# Patient Record
Sex: FEMALE | Race: OTHER | HISPANIC OR LATINO | Employment: UNEMPLOYED | URBAN - METROPOLITAN AREA
[De-identification: names, ages, dates, MRNs, and addresses within clinical notes are randomized per-mention and may not be internally consistent; named-entity substitution may affect disease eponyms.]

---

## 2018-02-27 ENCOUNTER — HOSPITAL ENCOUNTER (EMERGENCY)
Facility: HOSPITAL | Age: 28
Discharge: HOME | End: 2018-02-27
Payer: MEDICAID

## 2018-02-27 VITALS
HEIGHT: 62 IN | SYSTOLIC BLOOD PRESSURE: 132 MMHG | BODY MASS INDEX: 29.81 KG/M2 | RESPIRATION RATE: 18 BRPM | DIASTOLIC BLOOD PRESSURE: 66 MMHG | OXYGEN SATURATION: 99 % | TEMPERATURE: 98 F | WEIGHT: 162 LBS | HEART RATE: 73 BPM

## 2018-02-27 DIAGNOSIS — Z3A.08 8 WEEKS GESTATION OF PREGNANCY: Primary | ICD-10-CM

## 2018-02-27 DIAGNOSIS — R11.2 NAUSEA AND VOMITING, INTRACTABILITY OF VOMITING NOT SPECIFIED, UNSPECIFIED VOMITING TYPE: ICD-10-CM

## 2018-02-27 DIAGNOSIS — O20.9 VAGINAL BLEEDING AFFECTING EARLY PREGNANCY: ICD-10-CM

## 2018-02-27 DIAGNOSIS — R10.9 ABDOMINAL PAIN, UNSPECIFIED ABDOMINAL LOCATION: ICD-10-CM

## 2018-02-27 LAB
ABO + RH BLD: NORMAL
ALBUMIN SERPL BCP-MCNC: 3.8 G/DL
ALP SERPL-CCNC: 77 U/L
ALT SERPL W/O P-5'-P-CCNC: 10 U/L
ANION GAP SERPL CALC-SCNC: 8 MMOL/L
ANISOCYTOSIS BLD QL SMEAR: SLIGHT
AST SERPL-CCNC: 13 U/L
B-HCG UR QL: POSITIVE
BACTERIA GENITAL QL WET PREP: ABNORMAL
BASOPHILS # BLD AUTO: 0.03 K/UL
BASOPHILS NFR BLD: 0.3 %
BILIRUB SERPL-MCNC: 0.2 MG/DL
BILIRUB UR QL STRIP: NEGATIVE
BUN SERPL-MCNC: 9 MG/DL
CALCIUM SERPL-MCNC: 10.1 MG/DL
CHLORIDE SERPL-SCNC: 102 MMOL/L
CLARITY UR: CLEAR
CLUE CELLS VAG QL WET PREP: ABNORMAL
CO2 SERPL-SCNC: 27 MMOL/L
COLOR UR: NORMAL
CREAT SERPL-MCNC: 0.7 MG/DL
CTP QC/QA: YES
DACRYOCYTES BLD QL SMEAR: ABNORMAL
DIFFERENTIAL METHOD: ABNORMAL
EOSINOPHIL # BLD AUTO: 0.1 K/UL
EOSINOPHIL NFR BLD: 0.8 %
ERYTHROCYTE [DISTWIDTH] IN BLOOD BY AUTOMATED COUNT: 14.5 %
EST. GFR  (AFRICAN AMERICAN): >60 ML/MIN/1.73 M^2
EST. GFR  (NON AFRICAN AMERICAN): >60 ML/MIN/1.73 M^2
FILAMENT FUNGI VAG WET PREP-#/AREA: ABNORMAL
GLUCOSE SERPL-MCNC: 116 MG/DL
GLUCOSE UR QL STRIP: NEGATIVE
HCG INTACT+B SERPL-ACNC: NORMAL MIU/ML
HCT VFR BLD AUTO: 36.2 %
HGB BLD-MCNC: 12.4 G/DL
HGB UR QL STRIP: NEGATIVE
KETONES UR QL STRIP: NEGATIVE
LEUKOCYTE ESTERASE UR QL STRIP: NEGATIVE
LYMPHOCYTES # BLD AUTO: 1.7 K/UL
LYMPHOCYTES NFR BLD: 16.6 %
MCH RBC QN AUTO: 25.1 PG
MCHC RBC AUTO-ENTMCNC: 34.3 G/DL
MCV RBC AUTO: 73 FL
MONOCYTES # BLD AUTO: 0.6 K/UL
MONOCYTES NFR BLD: 5.5 %
NEUTROPHILS # BLD AUTO: 7.9 K/UL
NEUTROPHILS NFR BLD: 76.8 %
NITRITE UR QL STRIP: NEGATIVE
PH UR STRIP: 6 [PH] (ref 5–8)
PLATELET # BLD AUTO: 277 K/UL
PLATELET BLD QL SMEAR: ABNORMAL
PMV BLD AUTO: 11.2 FL
POTASSIUM SERPL-SCNC: 3.6 MMOL/L
PROT SERPL-MCNC: 8.3 G/DL
PROT UR QL STRIP: NEGATIVE
RBC # BLD AUTO: 4.95 M/UL
SODIUM SERPL-SCNC: 137 MMOL/L
SP GR UR STRIP: 1.01 (ref 1–1.03)
SPECIMEN SOURCE: ABNORMAL
T VAGINALIS GENITAL QL WET PREP: ABNORMAL
URN SPEC COLLECT METH UR: NORMAL
UROBILINOGEN UR STRIP-ACNC: NEGATIVE EU/DL
WBC # BLD AUTO: 10.33 K/UL
WBC #/AREA VAG WET PREP: ABNORMAL
YEAST GENITAL QL WET PREP: ABNORMAL

## 2018-02-27 PROCEDURE — 84702 CHORIONIC GONADOTROPIN TEST: CPT

## 2018-02-27 PROCEDURE — 63600175 PHARM REV CODE 636 W HCPCS: Performed by: NURSE PRACTITIONER

## 2018-02-27 PROCEDURE — 99284 EMERGENCY DEPT VISIT MOD MDM: CPT | Mod: 25

## 2018-02-27 PROCEDURE — 25000003 PHARM REV CODE 250: Performed by: NURSE PRACTITIONER

## 2018-02-27 PROCEDURE — 86901 BLOOD TYPING SEROLOGIC RH(D): CPT

## 2018-02-27 PROCEDURE — 80053 COMPREHEN METABOLIC PANEL: CPT

## 2018-02-27 PROCEDURE — 96360 HYDRATION IV INFUSION INIT: CPT

## 2018-02-27 PROCEDURE — 81003 URINALYSIS AUTO W/O SCOPE: CPT

## 2018-02-27 PROCEDURE — 81025 URINE PREGNANCY TEST: CPT | Performed by: NURSE PRACTITIONER

## 2018-02-27 PROCEDURE — 87491 CHLMYD TRACH DNA AMP PROBE: CPT

## 2018-02-27 PROCEDURE — 85025 COMPLETE CBC W/AUTO DIFF WBC: CPT

## 2018-02-27 PROCEDURE — 87210 SMEAR WET MOUNT SALINE/INK: CPT

## 2018-02-27 RX ORDER — ONDANSETRON 4 MG/1
4 TABLET, ORALLY DISINTEGRATING ORAL
Status: COMPLETED | OUTPATIENT
Start: 2018-02-27 | End: 2018-02-27

## 2018-02-27 RX ORDER — ONDANSETRON 4 MG/1
4 TABLET, ORALLY DISINTEGRATING ORAL EVERY 8 HOURS PRN
Qty: 10 TABLET | Refills: 0 | Status: SHIPPED | OUTPATIENT
Start: 2018-02-27 | End: 2018-03-12 | Stop reason: ALTCHOICE

## 2018-02-27 RX ADMIN — ONDANSETRON 4 MG: 4 TABLET, ORALLY DISINTEGRATING ORAL at 06:02

## 2018-02-27 RX ADMIN — DEXTROSE AND SODIUM CHLORIDE 1000 ML: 5; .9 INJECTION, SOLUTION INTRAVENOUS at 04:02

## 2018-02-27 NOTE — ED PROVIDER NOTES
"Encounter Date: 2018    SCRIBE #1 NOTE: I, Tiago Gomez, am scribing for, and in the presence of,  Alize Aceves NP. I have scribed the following portions of the note - Other sections scribed: HPI and ROS.       History     Chief Complaint   Patient presents with    Vaginal Bleeding     Vaginal bleeding with abdominal cramping x 1 hour.  8 weeks pregnant.     CC: Vaginal Bleeding    HPI: This 27 y.o F (8 weeks gravid A0) with no pertinent PMHx presents to the ED c/o acute onset of mild (2/10) lower abdominal cramping and vaginal bleeding which began approximately 30 minutes PTA. The pt reports blood clots in the toilet. The pt has been seen by an OBGYN and an ultrasound was performed 18. The pt also reports nausea, emesis, chills and decreased appetite since the onset of her pregnancy. The pt reports a previous episode of vaginal bleeding 2 months into her previous pregnancy. The pt denies fever, SOB, dysuria, frequency and vaginal discharge. The pt attempted tx with an OTC nausea medication.       The history is provided by the patient. No  was used.     Review of patient's allergies indicates:  No Known Allergies  History reviewed. No pertinent past medical history.  History reviewed. No pertinent surgical history.  Family History   Problem Relation Age of Onset    Breast cancer Neg Hx     Colon cancer Neg Hx     Ovarian cancer Neg Hx      Social History   Substance Use Topics    Smoking status: Never Smoker    Smokeless tobacco: Never Used    Alcohol use No     Review of Systems   Constitutional: Positive for chills. Negative for diaphoresis and fever.   HENT: Negative for rhinorrhea and sore throat.    Eyes: Negative for redness.   Respiratory: Negative for cough and shortness of breath.    Cardiovascular: Negative for chest pain.   Gastrointestinal: Positive for abdominal pain (lower, "cramping"), nausea and vomiting. Negative for diarrhea.   Genitourinary: Positive " for vaginal bleeding (clots). Negative for dysuria, frequency, pelvic pain, urgency, vaginal discharge and vaginal pain.   Musculoskeletal: Negative for back pain and neck pain.   Skin: Negative for rash.   Psychiatric/Behavioral: The patient is not nervous/anxious.        Physical Exam     Initial Vitals [02/27/18 1454]   BP Pulse Resp Temp SpO2   130/87 89 18 98.4 °F (36.9 °C) 99 %      MAP       101.33         Physical Exam    Nursing note and vitals reviewed.  Constitutional: Vital signs are normal. She appears well-developed and well-nourished. She is cooperative.  Non-toxic appearance. She does not have a sickly appearance. She does not appear ill. No distress.   HENT:   Head: Normocephalic and atraumatic.   Right Ear: External ear normal.   Left Ear: External ear normal.   Mouth/Throat: Uvula is midline, oropharynx is clear and moist and mucous membranes are normal.   Eyes: Conjunctivae and EOM are normal. Pupils are equal, round, and reactive to light.   Neck: Normal range of motion and full passive range of motion without pain. Neck supple.   Cardiovascular: Normal rate, regular rhythm, normal heart sounds, intact distal pulses and normal pulses. Exam reveals no decreased pulses.    No murmur heard.  Pulmonary/Chest: Effort normal and breath sounds normal. No respiratory distress. She has no decreased breath sounds. She has no wheezes. She exhibits no tenderness.   Abdominal: Soft. Normal appearance and bowel sounds are normal. She exhibits no distension and no mass. There is no tenderness. There is no rigidity, no rebound, no guarding and no CVA tenderness.   Genitourinary: Rectum normal and vagina normal. Pelvic exam was performed with patient supine. Uterus is enlarged. Uterus is not tender. Cervix exhibits no motion tenderness, no discharge and no friability. Right adnexum displays no tenderness. Left adnexum displays no tenderness. No tenderness or bleeding in the vagina. No vaginal discharge found.    Musculoskeletal: Normal range of motion.   Neurological: She is alert and oriented to person, place, and time. She has normal strength and normal reflexes. GCS eye subscore is 4. GCS verbal subscore is 5. GCS motor subscore is 6.   Skin: Skin is warm, dry and intact. Capillary refill takes less than 2 seconds. No rash noted. No pallor.   Psychiatric: She has a normal mood and affect. Her behavior is normal. Judgment and thought content normal.         ED Course   Procedures  Labs Reviewed   CBC W/ AUTO DIFFERENTIAL - Abnormal; Notable for the following:        Result Value    Hematocrit 36.2 (*)     MCV 73 (*)     MCH 25.1 (*)     Gran # (ANC) 7.9 (*)     Gran% 76.8 (*)     Lymph% 16.6 (*)     All other components within normal limits   COMPREHENSIVE METABOLIC PANEL - Abnormal; Notable for the following:     Glucose 116 (*)     All other components within normal limits   VAGINAL SCREEN - Abnormal; Notable for the following:     WBC - Vaginal Screen Rare (*)     Bacteria - Vaginal Screen Rare (*)     All other components within normal limits   POCT URINE PREGNANCY - Abnormal; Notable for the following:     POC Preg Test, Ur Positive (*)     All other components within normal limits   C. TRACHOMATIS/N. GONORRHOEAE BY AMP DNA   HCG, QUANTITATIVE, PREGNANCY   URINALYSIS   GROUP & RH          X-Rays:   Independently Interpreted Readings:   Other Readings:  Ultrasound shows a single intrauterine pregnancy corresponding to 8 weeks 5 days.  Fetal heart is 174.  Small amount of subchorionic hemorrhage.          APC / Resident Notes:   Hayley Coleman is a 27 y.o. Tongan-speaking gravid female who presents to the Emergency Department with abdominal cramping which began approximately one hour prior to arrival.  She subsequently was urinating and noticed a blood clot in the toilet.  Patient reports she is 8 weeks pregnant with her second child.  Patient sees Dr. Koch OB/GYN and has had adequate prenatal care in addition to an  ultrasound was performed on February 13 confirmed a single IUP.  This is the patient's second child.  Patient reports that with her first child she did start bleeding at 8 weeks however she was successfully able to carry the child to term.  She reports having nausea throughout her pregnancy and has diminished appetite with inability to hold liquids down on most days.  She reports that her abdominal pain had subsided by the time I examined her.    Physical Exam shows a non-toxic, afebrile, and well appearing female. Pertinent exam findings include HEENT normal, breath sounds are clear and equal in all fields, normal effort, abdomen is soft and nontender to palpation, uterus is not palpable.  Bowel sounds are present in all quadrants.  No CVA tenderness.  Pelvic exam shows a closed cervical os with no blood in the vaginal vault or in the os.  No rashes or lesions noted no trauma to the vagina.  Minimal amount of white stringy discharge.  No CMT, friability or petechiae.  Remaining exam is as above.    Vital Signs Are Reassuring. If available, previous records reviewed.   RESULTS: Urine pregnancy positive.  CMP shows no significant joint abnormality or acute kidney injury.  GC chlamydia pending.  Urinalysis shows no infection.  Vaginal screen negative for infection.  Beta Quant [id number].  CBC is without leukocytosis or significant anemia.    Ultrasound shows a single intrauterine pregnancy corresponding to 8 weeks 5 days.  Fetal heart is 174.  Small amount of subchorionic hemorrhage.  Arterial venous flow preserved to bilateral ovaries, suspected corpus luteum cyst in left ovary.  No free fluid.    My overall impression is threatened miscarriage . Differential diagnosis includes but is not limited to threatened miscarriage, subchorionic hemorrhage, vaginal trauma, ectopic pregnancy.      ED Course: zofran.  Patient was tolerating fluids prior to discharge.  D/C Meds: None. Additional D/C Information: Follow-up with  OB/GYN.  Pelvic rest.  Light duty at work.  No strenuous exercise until cleared by OB/GYN.  ED return precautions given.   and patient verbalize understanding of importance of follow-up with OB/GYN.  The diagnosis, treatment plan, instructions for follow-up and reevaluation with PCP as well as ED return precautions were discussed and understanding was verbalized. All questions or concerns have been addressed. Patient was discharged home with an instructional sheet which gave not only information regarding the most likely diagnoses but also information regarding when to return to the emergency department for alarming symptoms and when to seek further care.      This case was discussed with  Dr. Chaves who is in agreement with my assessment and plan.          Scribe Attestation:   Scribe #1: I performed the above scribed service and the documentation accurately describes the services I performed. I attest to the accuracy of the note.    Attending Attestation:     Physician Attestation Statement for NP/PA:   I discussed this assessment and plan of this patient with the NP/PA, but I did not personally examine the patient. The face to face encounter was performed by the NP/PA.        Physician Attestation for Scribe:  Physician Attestation Statement for Scribe #1: I, Alize Aceves NP, reviewed documentation, as scribed by Tiago Gomez in my presence, and it is both accurate and complete.                    Clinical Impression:   The primary encounter diagnosis was 8 weeks gestation of pregnancy. Diagnoses of Vaginal bleeding affecting early pregnancy, Abdominal pain, unspecified abdominal location, and Nausea and vomiting, intractability of vomiting not specified, unspecified vomiting type were also pertinent to this visit.    Disposition:   Disposition: Discharged  Condition: Stable                        Alize Aceves NP  02/27/18 2245       Jose Chaves MD  03/02/18 1002

## 2018-02-27 NOTE — ED TRIAGE NOTES
Pt. Reports she used the bathroom and noted a blood clot this AM. Pt. States she is 8 weeks pregnant. Pt. Is c/o abd cramping at the present moment.

## 2018-02-28 LAB
C TRACH DNA SPEC QL NAA+PROBE: NOT DETECTED
N GONORRHOEA DNA SPEC QL NAA+PROBE: NOT DETECTED

## 2018-02-28 NOTE — DISCHARGE INSTRUCTIONS
Deberá permanecer en reposo pélvico hasta que johnson obstetra / ginecólogo lo autorice. Por favor, haz un trabajo ligero si tienes que trabajar. Sin actividad extenuante Puede jennifer Zofran según sea necesario para las náuseas o los vómitos. Por favor mantente mane hidratado. Por favor regrese al departamento de emergencias por cualquier aumento en sangrado o cualquier preocupación.    Por favor, yong un seguimiento con johnson OB / GYN dentro de los próximos días.    Regrese al Departamento de Emergencia para cualquier síntoma nuevo o que empeore, martha fiebre, dolor en el pecho, dificultad para respirar, pérdida del conocimiento, mareos, debilidad o cualquier otra inquietud.    Por favor yong un seguimiento con johnson [facility] dentro de la semana. Si no tiene claudia, puede comunicarse con el que figura en johnson documentación de marilou o también puede llamar al [facility]as de la [facility] al [phone] para programar ian val con claudia.    Por favor, tome todos los medicamentos según lo prescrito.

## 2018-03-12 ENCOUNTER — HOSPITAL ENCOUNTER (EMERGENCY)
Facility: HOSPITAL | Age: 28
Discharge: HOME | End: 2018-03-12
Attending: EMERGENCY MEDICINE
Payer: MEDICAID

## 2018-03-12 VITALS
TEMPERATURE: 99 F | HEART RATE: 82 BPM | BODY MASS INDEX: 29.63 KG/M2 | DIASTOLIC BLOOD PRESSURE: 83 MMHG | SYSTOLIC BLOOD PRESSURE: 119 MMHG | OXYGEN SATURATION: 97 % | WEIGHT: 162 LBS | RESPIRATION RATE: 18 BRPM

## 2018-03-12 DIAGNOSIS — R51.9 NONINTRACTABLE EPISODIC HEADACHE, UNSPECIFIED HEADACHE TYPE: Primary | ICD-10-CM

## 2018-03-12 DIAGNOSIS — Z3A.13 13 WEEKS GESTATION OF PREGNANCY: ICD-10-CM

## 2018-03-12 DIAGNOSIS — R11.10 NON-INTRACTABLE VOMITING, PRESENCE OF NAUSEA NOT SPECIFIED, UNSPECIFIED VOMITING TYPE: ICD-10-CM

## 2018-03-12 LAB
ALBUMIN SERPL BCP-MCNC: 3.7 G/DL
ALP SERPL-CCNC: 74 U/L
ALT SERPL W/O P-5'-P-CCNC: 13 U/L
ANION GAP SERPL CALC-SCNC: 10 MMOL/L
AST SERPL-CCNC: 14 U/L
B-HCG UR QL: POSITIVE
BASOPHILS # BLD AUTO: 0.02 K/UL
BASOPHILS NFR BLD: 0.1 %
BILIRUB SERPL-MCNC: 0.4 MG/DL
BILIRUB UR QL STRIP: NEGATIVE
BUN SERPL-MCNC: 7 MG/DL
CALCIUM SERPL-MCNC: 10 MG/DL
CHLORIDE SERPL-SCNC: 102 MMOL/L
CLARITY UR: CLEAR
CO2 SERPL-SCNC: 24 MMOL/L
COLOR UR: YELLOW
CREAT SERPL-MCNC: 0.7 MG/DL
CTP QC/QA: YES
DIFFERENTIAL METHOD: ABNORMAL
EOSINOPHIL # BLD AUTO: 0.1 K/UL
EOSINOPHIL NFR BLD: 0.6 %
ERYTHROCYTE [DISTWIDTH] IN BLOOD BY AUTOMATED COUNT: 15.4 %
EST. GFR  (AFRICAN AMERICAN): >60 ML/MIN/1.73 M^2
EST. GFR  (NON AFRICAN AMERICAN): >60 ML/MIN/1.73 M^2
GLUCOSE SERPL-MCNC: 86 MG/DL
GLUCOSE UR QL STRIP: NEGATIVE
HCT VFR BLD AUTO: 38.4 %
HGB BLD-MCNC: 12.9 G/DL
HGB UR QL STRIP: NEGATIVE
KETONES UR QL STRIP: NEGATIVE
LEUKOCYTE ESTERASE UR QL STRIP: NEGATIVE
LIPASE SERPL-CCNC: 15 U/L
LYMPHOCYTES # BLD AUTO: 1.8 K/UL
LYMPHOCYTES NFR BLD: 13.5 %
MCH RBC QN AUTO: 25.3 PG
MCHC RBC AUTO-ENTMCNC: 33.6 G/DL
MCV RBC AUTO: 75 FL
MONOCYTES # BLD AUTO: 0.7 K/UL
MONOCYTES NFR BLD: 5.4 %
NEUTROPHILS # BLD AUTO: 11 K/UL
NEUTROPHILS NFR BLD: 80.1 %
NITRITE UR QL STRIP: NEGATIVE
PH UR STRIP: 7 [PH] (ref 5–8)
PLATELET # BLD AUTO: 238 K/UL
PMV BLD AUTO: 10.8 FL
POTASSIUM SERPL-SCNC: 3.7 MMOL/L
PROT SERPL-MCNC: 8.4 G/DL
PROT UR QL STRIP: NEGATIVE
RBC # BLD AUTO: 5.09 M/UL
SODIUM SERPL-SCNC: 136 MMOL/L
SP GR UR STRIP: 1.01 (ref 1–1.03)
URN SPEC COLLECT METH UR: NORMAL
UROBILINOGEN UR STRIP-ACNC: NEGATIVE EU/DL
WBC # BLD AUTO: 13.68 K/UL

## 2018-03-12 PROCEDURE — 80053 COMPREHEN METABOLIC PANEL: CPT

## 2018-03-12 PROCEDURE — 25000003 PHARM REV CODE 250: Performed by: NURSE PRACTITIONER

## 2018-03-12 PROCEDURE — 81025 URINE PREGNANCY TEST: CPT | Performed by: NURSE PRACTITIONER

## 2018-03-12 PROCEDURE — 81003 URINALYSIS AUTO W/O SCOPE: CPT

## 2018-03-12 PROCEDURE — 96361 HYDRATE IV INFUSION ADD-ON: CPT

## 2018-03-12 PROCEDURE — 99284 EMERGENCY DEPT VISIT MOD MDM: CPT | Mod: 25

## 2018-03-12 PROCEDURE — 85025 COMPLETE CBC W/AUTO DIFF WBC: CPT

## 2018-03-12 PROCEDURE — 63600175 PHARM REV CODE 636 W HCPCS: Performed by: NURSE PRACTITIONER

## 2018-03-12 PROCEDURE — 96374 THER/PROPH/DIAG INJ IV PUSH: CPT

## 2018-03-12 PROCEDURE — 83690 ASSAY OF LIPASE: CPT

## 2018-03-12 RX ORDER — ACETAMINOPHEN 500 MG
500 TABLET ORAL
Status: COMPLETED | OUTPATIENT
Start: 2018-03-12 | End: 2018-03-12

## 2018-03-12 RX ORDER — METOCLOPRAMIDE HYDROCHLORIDE 5 MG/ML
10 INJECTION INTRAMUSCULAR; INTRAVENOUS
Status: COMPLETED | OUTPATIENT
Start: 2018-03-12 | End: 2018-03-12

## 2018-03-12 RX ORDER — ONDANSETRON 8 MG/1
8 TABLET, ORALLY DISINTEGRATING ORAL
Status: COMPLETED | OUTPATIENT
Start: 2018-03-12 | End: 2018-03-12

## 2018-03-12 RX ORDER — ONDANSETRON 4 MG/1
4 TABLET, FILM COATED ORAL EVERY 8 HOURS PRN
Qty: 12 TABLET | Refills: 0 | Status: ON HOLD | OUTPATIENT
Start: 2018-03-12 | End: 2018-07-04 | Stop reason: HOSPADM

## 2018-03-12 RX ADMIN — ONDANSETRON 8 MG: 8 TABLET, ORALLY DISINTEGRATING ORAL at 12:03

## 2018-03-12 RX ADMIN — SODIUM CHLORIDE 1000 ML: 0.9 INJECTION, SOLUTION INTRAVENOUS at 10:03

## 2018-03-12 RX ADMIN — METOCLOPRAMIDE 10 MG: 5 INJECTION, SOLUTION INTRAMUSCULAR; INTRAVENOUS at 10:03

## 2018-03-12 RX ADMIN — ACETAMINOPHEN 500 MG: 500 TABLET ORAL at 10:03

## 2018-03-12 NOTE — ED PROVIDER NOTES
Encounter Date: 3/12/2018    SCRIBE #1 NOTE: I, Isamar Whatley, am scribing for, and in the presence of,  Sushil Becerra NP. I have scribed the following portions of the note - Other sections scribed: HPI and ROS.       History     Chief Complaint   Patient presents with    Headache     x 3 days. Last tylenol at 630 am    Vomiting     x 3days      Chief Complaint: Headache    HPI: This 11 weeks gravid  27 y.o. Female with no known  PMHx presents to the ED c/o a gradual onset intermittent posterior headache and neck pain.  She reports her symptoms began 3 days ago.  She describes these headaches as intermittent and severe.  She does report her symptoms are temporarily relieved with Tylenol but then return. Symptoms began 3 days ago. Headache is severe. Symptoms are mildly and temporarily resolved with Tylenol. There's associated nausea, vomiting x 4 in last 24 hours. No back pain at this time.       The history is provided by the patient. A  was used.     Review of patient's allergies indicates:  No Known Allergies  History reviewed. No pertinent past medical history.  History reviewed. No pertinent surgical history.  Family History   Problem Relation Age of Onset    Breast cancer Neg Hx     Colon cancer Neg Hx     Ovarian cancer Neg Hx      Social History   Substance Use Topics    Smoking status: Never Smoker    Smokeless tobacco: Never Used    Alcohol use Not on file     Review of Systems   Constitutional: Negative for chills and fever.   HENT: Negative for ear pain and sore throat.    Eyes: Negative for pain.   Respiratory: Negative for cough and shortness of breath.    Cardiovascular: Negative for chest pain.   Gastrointestinal: Positive for nausea and vomiting. Negative for abdominal pain and diarrhea.   Genitourinary: Negative for dysuria.   Musculoskeletal: Positive for neck pain. Negative for neck stiffness. Myalgias: arm or leg pain.   Skin: Negative for rash and wound.    Neurological: Positive for headaches. Negative for seizures and speech difficulty.   Psychiatric/Behavioral: Negative for confusion.       Physical Exam     Initial Vitals [03/12/18 0934]   BP Pulse Resp Temp SpO2   115/79 88 18 98.2 °F (36.8 °C) 100 %      MAP       91         Physical Exam    Nursing note and vitals reviewed.  Constitutional: She appears well-developed and well-nourished. She is not diaphoretic. She is cooperative.  Non-toxic appearance. She does not have a sickly appearance. No distress.   HENT:   Head: Normocephalic and atraumatic.   Right Ear: Tympanic membrane and external ear normal.   Left Ear: Tympanic membrane and external ear normal.   Nose: Nose normal.   Mouth/Throat: Uvula is midline, oropharynx is clear and moist and mucous membranes are normal. No trismus in the jaw.   Eyes: Conjunctivae and EOM are normal.   Neck: Normal range of motion and phonation normal. Neck supple. Muscular tenderness present. No spinous process tenderness present. No tracheal deviation and normal range of motion present. No neck rigidity.   Cardiovascular: Normal rate, regular rhythm and intact distal pulses.   Pulses:       Radial pulses are 2+ on the right side, and 2+ on the left side.   Pulmonary/Chest: Effort normal and breath sounds normal. No stridor. No tachypnea and no bradypnea. No respiratory distress. She has no wheezes. She has no rhonchi. She has no rales. She exhibits no tenderness.   Abdominal: Soft. There is no tenderness. There is no guarding.   Gravid   Musculoskeletal: Normal range of motion.   Neurological: She is alert and oriented to person, place, and time. She has normal strength. No sensory deficit. Coordination and gait normal. GCS eye subscore is 4. GCS verbal subscore is 5. GCS motor subscore is 6.   Skin: Skin is warm, dry and intact. No bruising and no rash noted. No cyanosis or erythema. Nails show no clubbing.   Psychiatric: She has a normal mood and affect. Her behavior is  normal. Judgment and thought content normal.         ED Course   Procedures  Labs Reviewed   CBC W/ AUTO DIFFERENTIAL - Abnormal; Notable for the following:        Result Value    WBC 13.68 (*)     MCV 75 (*)     MCH 25.3 (*)     RDW 15.4 (*)     Gran # (ANC) 11.0 (*)     Gran% 80.1 (*)     Lymph% 13.5 (*)     All other components within normal limits   POCT URINE PREGNANCY - Abnormal; Notable for the following:     POC Preg Test, Ur Positive (*)     All other components within normal limits   COMPREHENSIVE METABOLIC PANEL   LIPASE   URINALYSIS                   APC / Resident Notes:   This is an evaluation of a 27-year-old female that presents emergency Department with complaints of a gradual occipital headache and neck pain and vomiting.  She reports the headache is been ongoing for the last several days it is relieved with Tylenol however it returns. Physical Exam shows a non-toxic, afebrile, and well appearing female. Ears and throat without evidence infection.  Breath sounds are clear and equal auscultation.  Heart regular rhythm.  Her abdomen is gravid without tenderness.  She moves all extremities.  There is no lower extremity swelling or edema.  Cranial nerves II through XII grossly intact.  No ataxia.  Pupils are equal, round, reactive to light.  Neck is soft with no cervical adenopathy or meningeal signs.  She has no neck stiffness. Vital Signs Are Reassuring. If available, previous records reviewed. RESULTS: UPT Positive.  CBC with mildly elevated white count at 13.68.  Normal H&H.  Normal platelet count.  CMP normal.  Lipase normal. UA without infection or proteinuria.  Reassessment: After medications, patient reports her headache is completely resolved.  She is tolerating oral fluids.  She did report some nausea after drinking been no vomiting.  I will give her dose of Zofran prior to discharge.  Fetal heart tones present.  Given the tenderness of the neck in the headache, do believe this is likely a  tension headache.    My overall impression is headache and vomiting in pregnancy. I considered, but at this time, do not suspect eclampsia, preeclampsia, UTI, pyelonephritis, pancreatitis, electrolyte abnormality, meningitis, proteinuria.    ED Course: IV fluids, Reglan and Zofran. D/C Meds: Zofran. D/C Information: [place] diet and progress as tolerated, return if headaches change or worsen. The diagnosis, treatment plan, instructions for follow-up and reevaluation with OB/GYN this week as well as ED return precautions were discussed and understanding was verbalized. All questions or concerns have been addressed. This case was discussed with Dr. Hinson who is in agreement with my assessment and plan. LAINEY Louise, FNP-C        Scribe Attestation:   Scribe #1: I performed the above scribed service and the documentation accurately describes the services I performed. I attest to the accuracy of the note.    Attending Attestation:           Physician Attestation for Scribe:  Physician Attestation Statement for Scribe #1: I, Sushil Becerra, AZUL, reviewed documentation, as scribed by Isamar Whatley in my presence, and it is both accurate and complete.                    Clinical Impression:   The primary encounter diagnosis was Nonintractable episodic headache, unspecified headache type. Diagnoses of Non-intractable vomiting, presence of nausea not specified, unspecified vomiting type and 13 weeks gestation of pregnancy were also pertinent to this visit.    Disposition:   Disposition: Discharged  Condition: Stable                        PAUL Colón  03/12/18 5914

## 2018-03-12 NOTE — ED TRIAGE NOTES
Severe headache x 3 days with  Neck pain  And general weakness.Tylenol taken last night. Patient is 3 months pregnant. Denies vision changes.

## 2018-03-12 NOTE — DISCHARGE INSTRUCTIONS
Por favor regrese al Servicio de Urgencia por cualquier síntoma nuevo o que empeora, incluyendo: empeoramiento o cambios en el dolor de wade, dolor de wade no michelle por tylenol, fiebre, dolor en el pecho, dificultad para respirar, pérdida del conocimiento, mareos, debilidad o cualquier otra inquietud.    Por favor yong un seguimiento con johnson OB / GYN dentro de la semana. Si no tiene claudia, puede comunicarse con el que figura en johnson documentación de marilou o también puede llamar al [facility] al [phone] para programar ian val con claudia.    -------------------------    Please return to the Emergency Department for any new or worsening symptoms including: worsening or changes in the headache, headache not improved by tylenol, fever, chest pain, shortness of breath, loss of consciousness, dizziness, weakness, or any other concerns.     Please follow up with your OB/GYN within in the week. If you do not have one, you may contact the one listed on your discharge paperwork or you may also call the [facility] Appointment Desk at [phone] to schedule an appointment with one.

## 2018-03-16 ENCOUNTER — HOSPITAL ENCOUNTER (EMERGENCY)
Facility: HOSPITAL | Age: 28
Discharge: HOME | End: 2018-03-16
Attending: EMERGENCY MEDICINE
Payer: MEDICAID

## 2018-03-16 VITALS
RESPIRATION RATE: 18 BRPM | SYSTOLIC BLOOD PRESSURE: 113 MMHG | TEMPERATURE: 98 F | HEART RATE: 73 BPM | OXYGEN SATURATION: 99 % | DIASTOLIC BLOOD PRESSURE: 69 MMHG | WEIGHT: 165 LBS | BODY MASS INDEX: 30.18 KG/M2

## 2018-03-16 DIAGNOSIS — O20.9 VAGINAL BLEEDING IN PREGNANCY, FIRST TRIMESTER: Primary | ICD-10-CM

## 2018-03-16 LAB
B-HCG UR QL: POSITIVE
BACTERIA #/AREA URNS HPF: ABNORMAL /HPF
BASOPHILS # BLD AUTO: 0.02 K/UL
BASOPHILS NFR BLD: 0.2 %
BILIRUB UR QL STRIP: NEGATIVE
CLARITY UR: ABNORMAL
COLOR UR: ABNORMAL
CTP QC/QA: YES
DIFFERENTIAL METHOD: ABNORMAL
EOSINOPHIL # BLD AUTO: 0.2 K/UL
EOSINOPHIL NFR BLD: 1.5 %
ERYTHROCYTE [DISTWIDTH] IN BLOOD BY AUTOMATED COUNT: 15.5 %
GLUCOSE UR QL STRIP: NEGATIVE
HCG INTACT+B SERPL-ACNC: NORMAL MIU/ML
HCT VFR BLD AUTO: 35.5 %
HGB BLD-MCNC: 11.9 G/DL
HGB UR QL STRIP: ABNORMAL
HYALINE CASTS #/AREA URNS LPF: 0 /LPF
KETONES UR QL STRIP: NEGATIVE
LEUKOCYTE ESTERASE UR QL STRIP: ABNORMAL
LYMPHOCYTES # BLD AUTO: 2.1 K/UL
LYMPHOCYTES NFR BLD: 20.2 %
MCH RBC QN AUTO: 25.3 PG
MCHC RBC AUTO-ENTMCNC: 33.5 G/DL
MCV RBC AUTO: 76 FL
MICROSCOPIC COMMENT: ABNORMAL
MONOCYTES # BLD AUTO: 0.8 K/UL
MONOCYTES NFR BLD: 7.9 %
NEUTROPHILS # BLD AUTO: 7.4 K/UL
NEUTROPHILS NFR BLD: 69.8 %
NITRITE UR QL STRIP: NEGATIVE
PH UR STRIP: 7 [PH] (ref 5–8)
PLATELET # BLD AUTO: 228 K/UL
PMV BLD AUTO: 11.2 FL
PROT UR QL STRIP: ABNORMAL
RBC # BLD AUTO: 4.7 M/UL
RBC #/AREA URNS HPF: >100 /HPF (ref 0–4)
SP GR UR STRIP: 1.02 (ref 1–1.03)
SQUAMOUS #/AREA URNS HPF: 2 /HPF
URN SPEC COLLECT METH UR: ABNORMAL
UROBILINOGEN UR STRIP-ACNC: NEGATIVE EU/DL
WBC # BLD AUTO: 10.57 K/UL
WBC #/AREA URNS HPF: 2 /HPF (ref 0–5)

## 2018-03-16 PROCEDURE — 81000 URINALYSIS NONAUTO W/SCOPE: CPT

## 2018-03-16 PROCEDURE — 85025 COMPLETE CBC W/AUTO DIFF WBC: CPT

## 2018-03-16 PROCEDURE — 84702 CHORIONIC GONADOTROPIN TEST: CPT

## 2018-03-16 PROCEDURE — 99284 EMERGENCY DEPT VISIT MOD MDM: CPT | Mod: 25

## 2018-03-16 PROCEDURE — 81025 URINE PREGNANCY TEST: CPT | Performed by: NURSE PRACTITIONER

## 2018-03-17 NOTE — ED NOTES
Patient presents to the ED stating that she started having some vaginal bleeding today. Patient denies any clotting, cramping or pain. Patient denies any pain with urination.

## 2018-03-17 NOTE — DISCHARGE INSTRUCTIONS
Regrese al servicio de urgencias por cualquier síntoma nuevo o que empeore: dolor intenso, sangrado abundante, pérdida del conocimiento o cualquier otra inquietud. Por favor yong un seguimiento con johnson OB / GYN dentro de la semana. También puede llamar al [phone] para la línea de citas del mismo día de la [facility].

## 2018-03-17 NOTE — ED PROVIDER NOTES
Encounter Date: 3/16/2018       History     Chief Complaint   Patient presents with    Vaginal Bleeding     approximately 9-10 weeks. 2 weeks ago had vaginal bleeding. It started again today     Chief Complaint: Vaginal bleeding    HPI: 27 year old, G2,T1,P0,A0,L1 Yemeni speaking female, is 13 weeks pregnant and accompanied by . Presents to ED with vaginal bleeding, which occurred while walking around a store about 2 hours ago. She describes bleeding as moderate with no clots noted. She denies abdominal pain, cramping, fever, chills, dysuria, frequency, or vaginal discharge prior to bleeding.  She has not taken any medications. She was recently in ED four days ago with complaint of headache.  She states her last vaginal exam was one month ago.  Symptoms are acute, moderate, and constant in nature.       The history is provided by the patient. The history is limited by a language barrier. A  was used.     Review of patient's allergies indicates:  No Known Allergies  History reviewed. No pertinent past medical history.  History reviewed. No pertinent surgical history.  Family History   Problem Relation Age of Onset    Breast cancer Neg Hx     Colon cancer Neg Hx     Ovarian cancer Neg Hx      Social History   Substance Use Topics    Smoking status: Never Smoker    Smokeless tobacco: Never Used    Alcohol use No     Review of Systems   Constitutional: Negative for activity change, chills and fever.   HENT: Negative for congestion.    Eyes: Negative for discharge and redness.   Respiratory: Negative for chest tightness and shortness of breath.    Cardiovascular: Negative for chest pain and leg swelling.   Gastrointestinal: Positive for constipation and nausea. Negative for vomiting.   Genitourinary: Positive for vaginal bleeding. Negative for difficulty urinating, dyspareunia, dysuria, flank pain, pelvic pain, urgency, vaginal discharge and vaginal pain.   Musculoskeletal: Negative for  back pain.   Skin: Negative for rash.       Physical Exam     Initial Vitals [03/16/18 1937]   BP Pulse Resp Temp SpO2   127/75 103 18 98.9 °F (37.2 °C) 99 %      MAP       92.33         Physical Exam    Constitutional: Vital signs are normal. She appears well-developed.  Non-toxic appearance. She does not appear ill. No distress.   HENT:   Head: Normocephalic.   Right Ear: Hearing normal.   Left Ear: Hearing normal.   Nose: No rhinorrhea.   Mouth/Throat: Oropharynx is clear and moist and mucous membranes are normal.   Eyes: Conjunctivae and lids are normal. Pupils are equal, round, and reactive to light.   Neck: Trachea normal and normal range of motion. Neck supple.   Cardiovascular: Normal rate, S1 normal, S2 normal, normal heart sounds, intact distal pulses and normal pulses.   No murmur heard.  Pulmonary/Chest: Effort normal and breath sounds normal. No tachypnea. No respiratory distress. She has no wheezes. She has no rhonchi. She has no rales.   Abdominal: Soft. Normal appearance. There is no tenderness. There is no rebound, no guarding and no CVA tenderness.   Genitourinary: Pelvic exam was performed with patient supine. There is no rash, tenderness or lesion on the right labia. There is no rash, tenderness or lesion on the left labia. Cervix exhibits no discharge. Right adnexum displays no tenderness. Left adnexum displays no tenderness. There is bleeding (small amount int he vault) in the vagina. No tenderness in the vagina. No vaginal discharge found.       Genitourinary Comments: Chaparoned by PAUL Maddox student    Lymphadenopathy:     She has no cervical adenopathy.   Neurological: She is alert and oriented to person, place, and time. She has normal strength.   Skin: Skin is warm and intact.   Psychiatric: She has a normal mood and affect. Her speech is normal and behavior is normal. Thought content normal.         ED Course   Procedures  Labs Reviewed   URINALYSIS - Abnormal; Notable for  the following:        Result Value    Color, UA Red (*)     Appearance, UA Cloudy (*)     Protein, UA 2+ (*)     Occult Blood UA 3+ (*)     Leukocytes, UA Trace (*)     All other components within normal limits   CBC W/ AUTO DIFFERENTIAL - Abnormal; Notable for the following:     Hemoglobin 11.9 (*)     Hematocrit 35.5 (*)     MCV 76 (*)     MCH 25.3 (*)     RDW 15.5 (*)     All other components within normal limits   URINALYSIS MICROSCOPIC - Abnormal; Notable for the following:     RBC, UA >100 (*)     All other components within normal limits   POCT URINE PREGNANCY - Abnormal; Notable for the following:     POC Preg Test, Ur Positive (*)     All other components within normal limits   HCG, QUANTITATIVE, PREGNANCY             Medical Decision Making:   ED Management:  27 year old female, 13 weeks pregnant, G2,T1,P0,A0,L1, presents to ED with vaginal bleeding that started approx 2 hours ago while walking around a store. She has no pad count, but describes bleeding as moderate with no clots. She denies fever, chills, abdominal pain or cramping, dysuria, urgency, frequency, or back pain. Labs revealed appropriate elevation in HCG, negative U/A for UTI, and bedside u/s revealed live IUP with adequate fetal movement and heart beat. H&H was decreased most likely due to nature of pregnancy. Vaginal exam revealed moderate blood, which was removed to visualize cervix, which was closed with no visualization of protruding products.  No lesions or vaginal discharge noted.  There was no pelvic tenderness on palpation. Bleeding likely due to subchorionic hemorrhage due to proper increase in HCG, u/s findings, closed cervix, and no pain . Differential diagnosis include but are not limited to threatened , placenta abruption, adnexal mass, UTI,  Bacterial infection in origin. No evidence to suggest serious etiology. Patient was discharged with instructions to f/u with OBGYN within one week; pelvic precautions. Return to ED  if bleeding worsens or does not improve or develops pain with bleeding.                           Clinical Impression:   The encounter diagnosis was Vaginal bleeding in pregnancy, first trimester.    Disposition:   Disposition: Discharged  Condition: Stable                        Madelaine Thompson NP  03/16/18 [id number]

## 2018-04-25 PROBLEM — O28.0 ABNORMAL MATERNAL SERUM SCREENING TEST: Status: ACTIVE | Noted: 2018-04-25

## 2018-05-10 PROBLEM — O44.01 COMPLETE PLACENTA PREVIA NOS OR WITHOUT HEMORRHAGE, FIRST TRIMESTER: Status: ACTIVE | Noted: 2018-05-10

## 2018-05-19 ENCOUNTER — HOSPITAL ENCOUNTER (OUTPATIENT)
Facility: HOSPITAL | Age: 28
Discharge: HOME | End: 2018-05-19
Attending: OBSTETRICS & GYNECOLOGY | Admitting: OBSTETRICS & GYNECOLOGY
Payer: MEDICAID

## 2018-05-19 VITALS
BODY MASS INDEX: 30.48 KG/M2 | HEIGHT: 63 IN | HEART RATE: 96 BPM | DIASTOLIC BLOOD PRESSURE: 70 MMHG | TEMPERATURE: 98 F | SYSTOLIC BLOOD PRESSURE: 110 MMHG | WEIGHT: 172 LBS | RESPIRATION RATE: 18 BRPM

## 2018-05-19 DIAGNOSIS — O44.01 COMPLETE PLACENTA PREVIA NOS OR WITHOUT HEMORRHAGE, FIRST TRIMESTER: Primary | ICD-10-CM

## 2018-05-19 LAB
ABO + RH BLD: NORMAL
BASOPHILS # BLD AUTO: 0.01 K/UL
BASOPHILS NFR BLD: 0.1 %
BLD GP AB SCN CELLS X3 SERPL QL: NORMAL
DIFFERENTIAL METHOD: ABNORMAL
EOSINOPHIL # BLD AUTO: 0.1 K/UL
EOSINOPHIL NFR BLD: 0.7 %
ERYTHROCYTE [DISTWIDTH] IN BLOOD BY AUTOMATED COUNT: 15.6 %
HCT VFR BLD AUTO: 33.6 %
HGB BLD-MCNC: 11.8 G/DL
LYMPHOCYTES # BLD AUTO: 1.4 K/UL
LYMPHOCYTES NFR BLD: 11.8 %
MCH RBC QN AUTO: 27 PG
MCHC RBC AUTO-ENTMCNC: 35.1 G/DL
MCV RBC AUTO: 77 FL
MONOCYTES # BLD AUTO: 0.6 K/UL
MONOCYTES NFR BLD: 4.8 %
NEUTROPHILS # BLD AUTO: 10 K/UL
NEUTROPHILS NFR BLD: 81.9 %
PLATELET # BLD AUTO: 226 K/UL
PMV BLD AUTO: 10.9 FL
RBC # BLD AUTO: 4.37 M/UL
WBC # BLD AUTO: 12.19 K/UL

## 2018-05-19 PROCEDURE — 85025 COMPLETE CBC W/AUTO DIFF WBC: CPT

## 2018-05-19 PROCEDURE — 86901 BLOOD TYPING SEROLOGIC RH(D): CPT

## 2018-05-19 PROCEDURE — 99211 OFF/OP EST MAY X REQ PHY/QHP: CPT | Mod: 25

## 2018-05-19 PROCEDURE — 36415 COLL VENOUS BLD VENIPUNCTURE: CPT

## 2018-05-19 NOTE — PLAN OF CARE
"27 yr old, gr 2 p 1 , 20.5 wks  female with  with complaint of vag bleeding in toilet and on tissue post voiding early this morning.   has history of placenta  Previa.  Small amt dark red vag discharge noted on pantie liner she worn to hospital.  last sex 2 wks ago.   EFM applied.  Luxembourgish interrupter # [id number] via Vitasol used for assessment/, history and plan of care . Pt denies cramping or amb pain.  had " brain pain" last night and  she took 1 tylenol at 9 pm.    "

## 2018-05-19 NOTE — TREATMENT PLAN
Up to bathroom to void.  Pt denies pain 0/10, or vag bleeding.  Dr. Blanton called with lab results, 0 contractions or vag bleeding.  Discharge order with discharge instructions for pelvic rest received.  Written discharge instructions  And information concerning placenta previa given.  Verified understanding at 1220.  Discharged per amb with .

## 2018-05-19 NOTE — PROGRESS NOTES
27 y.o. at 20w5d dx'd with previa last week comes in with bleeding - not heavy  No cramping or pain  Temp:  [98.4 °F (36.9 °C)-98.6 °F (37 °C)] 98.4 °F (36.9 °C)  Pulse:  [] 79  Resp:  [16-18] 18  SpO2:  [100 %] 100 %  BP: (111-136)/(70-84) 114/70  fht +  occ irritability    Previa with bleeding- not heavy  Non viable gestational age  Will observe several more hours and if no more bleeding d/c home

## 2018-05-19 NOTE — DISCHARGE INSTRUCTIONS
Home Undelivered Discharge Instructions    After Discharge Orders:    Future Appointments  Date Time Provider Department Center   2018 3:00 PM Penelope Salinas MD [facility]       Call physician or midwife's office for instructions.    Current Discharge Medication List      CONTINUE these medications which have NOT CHANGED    Details   doxylamine succinate (UNISOM, DOXYLAMINE,) 25 mg tablet Take 1 tablet (25 mg total) by mouth nightly as needed for Insomnia.  Qty: 30 tablet, Refills: 1      GINGER ROOT (GINGER, ZINGIBER OFFICINALIS,) 550 mg Cap Take 1 capsule by mouth once daily.  Qty: 30 each, Refills: 3      ondansetron (ZOFRAN) 4 MG tablet Take 1 tablet (4 mg total) by mouth every 8 (eight) hours as needed for Nausea.  Qty: 12 tablet, Refills: 0      prenatal vit103-iron fum-folic () 27 mg iron- 1 mg Tab Take 1 tablet by mouth once daily.  Qty: 30 tablet, Refills: 11    Associated Diagnoses: Threatened       pyridoxine, vitamin B6, (B-6) 25 MG Tab Take 1 tablet (25 mg total) by mouth once daily.  Qty: 30 tablet, Refills: 2                     · Diet:  normal diet as tolerated    · Rest: no sex    Other instructions: Do kick counts once a day on your baby. Choose the time of day your baby is most active. Get in a comfortable lying or sitting position and time how long it takes to feel 10 kicks, twists, turns, swishes, or rolls. Call your physician or midwife if there have not been 10 kicks in 1.5 hours    Call physician or midwife, return to Labor and Delivery, call 911, or go to the nearest Emergency Room if: increased leakage or fluid, contractions more than  6 per  1 hour, decreased fetal movement, persistent low back pain or cramping, bleeding from vaginal area, difficulty urinating, pain with urination, difficulty breathing, new calf pain, persistent headache or vision change

## 2018-05-29 PROBLEM — D21.9 FIBROID: Status: ACTIVE | Noted: 2018-05-29

## 2018-06-19 ENCOUNTER — HOSPITAL ENCOUNTER (INPATIENT)
Facility: HOSPITAL | Age: 28
LOS: 3 days | Discharge: HOME | End: 2018-06-22
Attending: OBSTETRICS & GYNECOLOGY | Admitting: OBSTETRICS & GYNECOLOGY
Payer: MEDICAID

## 2018-06-19 DIAGNOSIS — O46.90 VAGINAL BLEEDING DURING PREGNANCY: ICD-10-CM

## 2018-06-19 DIAGNOSIS — O44.01 COMPLETE PLACENTA PREVIA NOS OR WITHOUT HEMORRHAGE, FIRST TRIMESTER: Primary | ICD-10-CM

## 2018-06-19 LAB
ABO + RH BLD: NORMAL
BASOPHILS # BLD AUTO: 0.02 K/UL
BASOPHILS NFR BLD: 0.2 %
BLD GP AB SCN CELLS X3 SERPL QL: NORMAL
DIFFERENTIAL METHOD: ABNORMAL
EOSINOPHIL # BLD AUTO: 0.1 K/UL
EOSINOPHIL NFR BLD: 1.3 %
ERYTHROCYTE [DISTWIDTH] IN BLOOD BY AUTOMATED COUNT: 15.1 %
HCT VFR BLD AUTO: 32.4 %
HGB BLD-MCNC: 11.1 G/DL
LYMPHOCYTES # BLD AUTO: 1.8 K/UL
LYMPHOCYTES NFR BLD: 17 %
MCH RBC QN AUTO: 27.5 PG
MCHC RBC AUTO-ENTMCNC: 34.3 G/DL
MCV RBC AUTO: 80 FL
MONOCYTES # BLD AUTO: 0.6 K/UL
MONOCYTES NFR BLD: 5.1 %
NEUTROPHILS # BLD AUTO: 8.2 K/UL
NEUTROPHILS NFR BLD: 75.5 %
PLATELET # BLD AUTO: 190 K/UL
PMV BLD AUTO: 10.4 FL
RBC # BLD AUTO: 4.03 M/UL
WBC # BLD AUTO: 10.81 K/UL

## 2018-06-19 PROCEDURE — 96360 HYDRATION IV INFUSION INIT: CPT

## 2018-06-19 PROCEDURE — 25000003 PHARM REV CODE 250: Performed by: OBSTETRICS & GYNECOLOGY

## 2018-06-19 PROCEDURE — 96361 HYDRATE IV INFUSION ADD-ON: CPT

## 2018-06-19 PROCEDURE — 99211 OFF/OP EST MAY X REQ PHY/QHP: CPT

## 2018-06-19 PROCEDURE — 85025 COMPLETE CBC W/AUTO DIFF WBC: CPT

## 2018-06-19 PROCEDURE — 86850 RBC ANTIBODY SCREEN: CPT

## 2018-06-19 PROCEDURE — 11000001 HC ACUTE MED/SURG PRIVATE ROOM

## 2018-06-19 PROCEDURE — 36415 COLL VENOUS BLD VENIPUNCTURE: CPT

## 2018-06-19 PROCEDURE — 63600175 PHARM REV CODE 636 W HCPCS: Performed by: OBSTETRICS & GYNECOLOGY

## 2018-06-19 RX ORDER — ACETAMINOPHEN 500 MG
1000 TABLET ORAL EVERY 6 HOURS PRN
Status: DISCONTINUED | OUTPATIENT
Start: 2018-06-19 | End: 2018-06-19

## 2018-06-19 RX ORDER — CALCIUM GLUCONATE 98 MG/ML
1 INJECTION, SOLUTION INTRAVENOUS
Status: DISCONTINUED | OUTPATIENT
Start: 2018-06-19 | End: 2018-06-21

## 2018-06-19 RX ORDER — BETAMETHASONE SODIUM PHOSPHATE AND BETAMETHASONE ACETATE 3; 3 MG/ML; MG/ML
12 INJECTION, SUSPENSION INTRA-ARTICULAR; INTRALESIONAL; INTRAMUSCULAR; SOFT TISSUE EVERY 12 HOURS
Status: DISCONTINUED | OUTPATIENT
Start: 2018-06-19 | End: 2018-06-19

## 2018-06-19 RX ORDER — MAGNESIUM SULFATE HEPTAHYDRATE 40 MG/ML
2 INJECTION, SOLUTION INTRAVENOUS ONCE
Status: COMPLETED | OUTPATIENT
Start: 2018-06-19 | End: 2018-06-19

## 2018-06-19 RX ORDER — MAGNESIUM SULFATE HEPTAHYDRATE 40 MG/ML
1 INJECTION, SOLUTION INTRAVENOUS CONTINUOUS
Status: DISCONTINUED | OUTPATIENT
Start: 2018-06-19 | End: 2018-06-19

## 2018-06-19 RX ORDER — SODIUM CHLORIDE, SODIUM LACTATE, POTASSIUM CHLORIDE, CALCIUM CHLORIDE 600; 310; 30; 20 MG/100ML; MG/100ML; MG/100ML; MG/100ML
1000 INJECTION, SOLUTION INTRAVENOUS CONTINUOUS
Status: DISCONTINUED | OUTPATIENT
Start: 2018-06-19 | End: 2018-06-19

## 2018-06-19 RX ORDER — BETAMETHASONE SODIUM PHOSPHATE AND BETAMETHASONE ACETATE 3; 3 MG/ML; MG/ML
12 INJECTION, SUSPENSION INTRA-ARTICULAR; INTRALESIONAL; INTRAMUSCULAR; SOFT TISSUE ONCE
Status: COMPLETED | OUTPATIENT
Start: 2018-06-20 | End: 2018-06-20

## 2018-06-19 RX ORDER — SODIUM CHLORIDE, SODIUM LACTATE, POTASSIUM CHLORIDE, CALCIUM CHLORIDE 600; 310; 30; 20 MG/100ML; MG/100ML; MG/100ML; MG/100ML
INJECTION, SOLUTION INTRAVENOUS CONTINUOUS
Status: DISCONTINUED | OUTPATIENT
Start: 2018-06-19 | End: 2018-06-19

## 2018-06-19 RX ORDER — ACETAMINOPHEN 325 MG/1
650 TABLET ORAL EVERY 6 HOURS PRN
Status: DISCONTINUED | OUTPATIENT
Start: 2018-06-19 | End: 2018-06-22 | Stop reason: HOSPADM

## 2018-06-19 RX ADMIN — SODIUM CHLORIDE, SODIUM LACTATE, POTASSIUM CHLORIDE, AND CALCIUM CHLORIDE 1000 ML: .6; .31; .03; .02 INJECTION, SOLUTION INTRAVENOUS at 04:06

## 2018-06-19 RX ADMIN — SODIUM CHLORIDE, SODIUM LACTATE, POTASSIUM CHLORIDE, AND CALCIUM CHLORIDE 1000 ML: .6; .31; .03; .02 INJECTION, SOLUTION INTRAVENOUS at 02:06

## 2018-06-19 RX ADMIN — ACETAMINOPHEN 650 MG: 325 TABLET, FILM COATED ORAL at 04:06

## 2018-06-19 RX ADMIN — MAGNESIUM SULFATE IN WATER 2 G: 40 INJECTION, SOLUTION INTRAVENOUS at 11:06

## 2018-06-19 RX ADMIN — SODIUM CHLORIDE, SODIUM LACTATE, POTASSIUM CHLORIDE, AND CALCIUM CHLORIDE 1000 ML: .6; .31; .03; .02 INJECTION, SOLUTION INTRAVENOUS at 09:06

## 2018-06-19 RX ADMIN — BETAMETHASONE SODIUM PHOSPHATE AND BETAMETHASONE ACETATE 12 MG: 3; 3 INJECTION, SUSPENSION INTRA-ARTICULAR; INTRALESIONAL; INTRAMUSCULAR at 11:06

## 2018-06-19 RX ADMIN — SODIUM CHLORIDE, SODIUM LACTATE, POTASSIUM CHLORIDE, AND CALCIUM CHLORIDE: .6; .31; .03; .02 INJECTION, SOLUTION INTRAVENOUS at 03:06

## 2018-06-19 RX ADMIN — MAGNESIUM SULFATE IN WATER 1 G/HR: 40 INJECTION, SOLUTION INTRAVENOUS at 11:06

## 2018-06-19 NOTE — NURSING
Resting comf. at bedside.Abd soft with active fetus.No c/o pain or discomfort.Plan of care reviewed.No vaginal bleeding noted at this time

## 2018-06-19 NOTE — NURSING
0200 - Pt is  at 25 weeks 1 day arrived to L&D unit via wheelchair with complaints of small amount of vaginal bleeding when wiping. Denies pain other than mild back pain. Pt denies any urinary symptoms, ctx or leaking of fluid. +FM. Care assumed. Full assessment done, history and medications reviewed with pt. Pt updated on plan of care with questions answered. Bed in low locked position, call bell in reach. Pt placed on EFM and TOCO, automatic BP and pulse ox.  at bedside to translate.    0214 - Call placed to Dr. Nuñez with pt status and arrival, c/o of small amount of bleeding when wiping, FHTs mod variability with occasional variables, ctx approx 6-10 mins, mild to palpation. Orders to monitor pt for a few hours, IVF bolus and maintenance fluids.    0230 - Pt updated on plan of care via Lexie REILLY [id number]. Questions answered, pt verbalized understanding.    0315 - Assisted pt to restroom, bright red bleeding noted in toilet and small amount (baseball size) noted to tissue.    0630 - last 3 times using the restroom light pink tinged bleeding noted to tissue, no bleeding noted to pad or in toilet.    0647 - Update given to Dr. Kruger of pt status, MD will see pt this morning.    0650 - Report given to oncoming shift

## 2018-06-19 NOTE — NURSING
Info provided re; magnesium sulfate therapy and celestone with SUPA interpretor #[id number].Plan of care reviewed questions answered.Transferred to room 215 in stable cond.No vaginal bleeding

## 2018-06-19 NOTE — H&P
[facility]  Obstetrics & Gynecology  History & Physical    Patient Name: Hayley Coleman  MRN: [id number]  Admission Date: 2018  Primary Care Provider: To Obtain Unable    Subjective:     Chief Complaint/Reason for Admission: vaginal bleeding    History of Present Illness: 28 y/o  at 25 weeks 1 days presents to L&D c/o moderate vaginal bleeding on pad and some contractions.  IUP c/b:  Patient Active Problem List   Diagnosis    Abnormal maternal serum screening test    Complete placenta previa     Fibroid - multiple fibroids, largest posterior 3 cm    Vaginal bleeding during pregnancy   Language barrier - English only  Overnight the bleeding and contractions ceased and at present pt denies VB, LOF, ctx.  +FM.      No current facility-administered medications on file prior to encounter.      Current Outpatient Prescriptions on File Prior to Encounter   Medication Sig    doxylamine succinate (UNISOM, DOXYLAMINE,) 25 mg tablet Take 1 tablet (25 mg total) by mouth nightly as needed for Insomnia.    GINGER ROOT (GINGER, ZINGIBER OFFICINALIS,) 550 mg Cap Take 1 capsule by mouth once daily.    ondansetron (ZOFRAN) 4 MG tablet Take 1 tablet (4 mg total) by mouth every 8 (eight) hours as needed for Nausea.    prenatal vit103-iron fum-folic () 27 mg iron- 1 mg Tab Take 1 tablet by mouth once daily.    pyridoxine, vitamin B6, (B-6) 25 MG Tab Take 1 tablet (25 mg total) by mouth once daily.       Review of patient's allergies indicates:  No Known Allergies    History reviewed. No pertinent past medical history.  OB History    Para Term  AB Living   2 1 1     1   SAB TAB Ectopic Multiple Live Births           1      # Outcome Date GA Lbr Redd/2nd Weight Sex Delivery Anes PTL Lv   2 Current            1 Term 2012   3.629 kg (8 lb) M Vag-Spont   EDGAR      Complications: Preeclampsia        History reviewed. No pertinent surgical history.  Family History     None        Social  History Main Topics    Smoking status: Never Smoker    Smokeless tobacco: Never Used    Alcohol use No    Drug use: No    Sexual activity: Yes     Partners: Male     Review of Systems   Constitutional: Negative.    Respiratory: Negative.    Cardiovascular: Negative.    Genitourinary: Positive for pelvic pain and vaginal bleeding.     Objective:     Vital Signs (Most Recent):  Temp: 97.8 °F (36.6 °C) (06/19/18 0750)  Pulse: 85 (06/19/18 0750)  Resp: 16 (06/19/18 0640)  BP: (!) 115/56 (06/19/18 0750)  SpO2: 98 % (06/19/18 0750) Vital Signs (24h Range):  Temp:  [97.6 °F (36.4 °C)-97.8 °F (36.6 °C)] 97.8 °F (36.6 °C)  Pulse:  [82-95] 85  Resp:  [16] 16  SpO2:  [97 %-100 %] 98 %  BP: (101-118)/(56-75) 115/56     Weight: 80.7 kg (178 lb)  Body mass index is 31.53 kg/m².  Patient's last menstrual period was 12/25/2017 (exact date).    Physical Exam:   Constitutional: She is oriented to person, place, and time. She appears well-developed and well-nourished.             Abdominal: Soft. There is no tenderness.     Genitourinary:   Genitourinary Comments: Uterus gravid, nontender.  EFM 150s mod BTBV, 10 beat accels  [place] rare ctx.               Neurological: She is alert and oriented to person, place, and time.    Skin: Skin is warm and dry.        Laboratory:  pending    Diagnostic Results:  US pending    Assessment/Plan:     Active Diagnoses:    Diagnosis Date Noted POA    Vaginal bleeding during pregnancy [O46.90] 06/19/2018 Yes      Problems Resolved During this Admission:    Diagnosis Date Noted Date Resolved POA       IUP @ 25 weeks 1 days  Complete placenta previa  Vaginal bleeding     Discussed case with Dr. Rutherford.  Will admit patient for at least 72 hours of monitoring.  Will give BMZ series.  Will give 12 hours of Magnesium for neuroprotection.  Continuous fetal monitoring while on Magnesium.  Type and screen and CBC ordered.  If no further bleeding,  Consider discharge after 72 hours.     Madelaine  MD Fransisca  Obstetrics & Gynecology  [facility]

## 2018-06-20 PROCEDURE — 11000001 HC ACUTE MED/SURG PRIVATE ROOM

## 2018-06-20 PROCEDURE — 63600175 PHARM REV CODE 636 W HCPCS: Performed by: OBSTETRICS & GYNECOLOGY

## 2018-06-20 PROCEDURE — 59025 FETAL NON-STRESS TEST: CPT

## 2018-06-20 RX ADMIN — BETAMETHASONE SODIUM PHOSPHATE AND BETAMETHASONE ACETATE 12 MG: 3; 3 INJECTION, SUSPENSION INTRA-ARTICULAR; INTRALESIONAL; INTRAMUSCULAR at 07:06

## 2018-06-20 NOTE — NURSING
- Report received of  at 25 weeks 1 days admitted for vaginal bleeding monitoring and ptl prophylaxis from [facility]Y Driver. Care assumed. Full assessment done, history and medications reviewed with pt. Pt and family updated on plan of care with questions answered. Bed in low locked position, call bell in reach.     - Call placed to Dr. Kruger to clarify orders. BMZ order changed to q24h. Mag to be discontinued at 2300. Cont monitoring while on mag. Update given, no bleeding and no ctx. Reassuring FHTs.    0680 - Report given to oncoming shift

## 2018-06-20 NOTE — PROGRESS NOTES
Subjective:     Ms Coleman is a 26 yo  @ 25w2d admitted for vaginal bleeding with complete placenta previa.  No acute events overnight with no additional bleeding. Denies any leakage of fluid, vaginal bleeding, or contractions/abdominal pain.  Reports +fetal movement.    Review of Systems  Nine system ROS negative    Objective:   Vitals:  Temp: 98.2 °F (36.8 °C) (18 0800)  Pulse: 84 (18 0800)  Resp: 16 (18 0255)  BP: 110/63 (18 0800)  SpO2: 98 % (18 2235)    Temp:  [97.9 °F (36.6 °C)-98.3 °F (36.8 °C)]   Pulse:  []   Resp:  [16]   BP: ()/(58-77)   SpO2:  [97 %-100 %]     FHTs: category 1  TOCO: no contractions    General: no acute distress, alert and oriented to person, place and time  Abdomen: gravid, nontender to palpation, no palpable contractions  Incision(s): none  Pelvic: deferred - reports no bleeding  Extremities: calves non-tender to palpation, no calf edema, erythema or palpable cords      Recent Labs  Lab 18  0941   WBC 10.81   HGB 11.1*   HCT 32.4*        O+/-    Assessment/Plan:     26 yo  @ 25w2d with complete placenta previa and vaginal bleeding - now resolved.  Continue betamethasone series.  Now s/p magnesium sulfate for neuroprotection.  Will keep in-house for 72 hrs as planned.

## 2018-06-21 PROCEDURE — 59025 FETAL NON-STRESS TEST: CPT

## 2018-06-21 PROCEDURE — 11000001 HC ACUTE MED/SURG PRIVATE ROOM

## 2018-06-21 NOTE — PROGRESS NOTES
Reported some cramping earlier to the nurse, none now, no bleeding, no LOF, +FM.  Vitals:    06/20/18 1235 06/20/18 1615 06/20/18 1906 06/20/18 2302   BP: 117/61 113/66 127/65 115/63   Pulse: 88 85 84 77   Resp:   18 16   Temp: 98.1 °F (36.7 °C) 98.6 °F (37 °C) 98.6 °F (37 °C) 98.3 °F (36.8 °C)   TempSrc:   Oral Oral   SpO2:       Weight:       Height:         EFM 150s 10 beat accels no decels  [place] no ctx  Abd soft NT gravid    Assessment IUP @ 25 weeks 3 days, placenta previa  Plan If no further bleeding, will plan to d/c tomorrow.  Pt states she lives 15 minutes away and has a family member who can transport her to L&D  S/p BMZ x2 and Magnesium for neuroprotection

## 2018-06-21 NOTE — PROGRESS NOTES
Patient asking for water, states she felt a ctx or two, large glass of water given. Will place on efm after she finishes eating breakfast....denies any vaginal bleeding or discharge.

## 2018-06-21 NOTE — PROGRESS NOTES
To room, patient states she no longer feels any discomfort after drinking her glass of water. Pain score of zero. Patient placed on efm for morning monitoring.

## 2018-06-21 NOTE — PROGRESS NOTES
To room with SUPA Mendoza used for translation with patient. All questions answered. Plan of care discussed at length. All questions answered. Plan for patient to possibly go home tomorrow if not bleeding or ctxs .

## 2018-06-21 NOTE — PROGRESS NOTES
To room, family at bedside, patient wants to take shower, up to shower, changed linen, room picked up.

## 2018-06-22 VITALS
WEIGHT: 178 LBS | HEIGHT: 63 IN | OXYGEN SATURATION: 98 % | RESPIRATION RATE: 16 BRPM | TEMPERATURE: 98 F | DIASTOLIC BLOOD PRESSURE: 59 MMHG | HEART RATE: 81 BPM | SYSTOLIC BLOOD PRESSURE: 110 MMHG | BODY MASS INDEX: 31.54 KG/M2

## 2018-06-22 PROCEDURE — 72100002 HC LABOR CARE, 1ST 8 HOURS

## 2018-06-22 PROCEDURE — 59025 FETAL NON-STRESS TEST: CPT

## 2018-06-22 NOTE — DISCHARGE INSTRUCTIONS
Home Undelivered Discharge Instructions    After Discharge Orders:    No future appointments.    Call physician or midwife's office [phone] for instructions.    Current Discharge Medication List      CONTINUE these medications which have NOT CHANGED    Details   doxylamine succinate (UNISOM, DOXYLAMINE,) 25 mg tablet Take 1 tablet (25 mg total) by mouth nightly as needed for Insomnia.  Qty: 30 tablet, Refills: 1      GINGER ROOT (GINGER, ZINGIBER OFFICINALIS,) 550 mg Cap Take 1 capsule by mouth once daily.  Qty: 30 each, Refills: 3      ondansetron (ZOFRAN) 4 MG tablet Take 1 tablet (4 mg total) by mouth every 8 (eight) hours as needed for Nausea.  Qty: 12 tablet, Refills: 0      prenatal vit103-iron fum-folic () 27 mg iron- 1 mg Tab Take 1 tablet by mouth once daily.  Qty: 30 tablet, Refills: 11    Associated Diagnoses: Threatened       pyridoxine, vitamin B6, (B-6) 25 MG Tab Take 1 tablet (25 mg total) by mouth once daily.  Qty: 30 tablet, Refills: 2                     · Diet:  normal diet as tolerated    · Rest: normal activity as tolerated    Other instructions: Do kick counts once a day on your baby. Choose the time of day your baby is most active. Get in a comfortable lying or sitting position and time how long it takes to feel 10 kicks, twists, turns, swishes, or rolls. Call your physician or midwife if there have not been 10 kicks in 2 hours    Call physician or midwife, return to Labor and Delivery, call [phone], or go to the nearest Emergency Room if: increased leakage or fluid, contractions more than  4 per  1 hour, decreased fetal movement, persistent low back pain or cramping, bleeding from vaginal area, difficulty urinating, pain with urination, difficulty breathing, new calf pain, persistent headache or vision change .    Si [facility] vaginal regrese al [facility] inmediatamente!!

## 2018-06-22 NOTE — NURSING
Rounds made per Dr Nuñez.Discharge order noted.Written discharge instructions provided in Vietnamese.Reviewed with SUPA   # [id number].Verb.understanding.Discharged home in stable condition

## 2018-06-22 NOTE — DISCHARGE SUMMARY
[facility]  Discharge Summary      Admit Date: 6/19/2018    Discharge Date and Time:  06/22/2018 11:12 AM    Attending Physician: Penelope Salinas MD     Reason for Admission: 25 weeks bleeding    Procedures Performed: * No surgery found *    Hospital Course (synopsis of major diagnoses, care, treatment, and services provided during the course of the hospital stay): 26 yo at 25w4d admitted with known previa and vaginal bleeding  Received bmz x 2 and magnesium and bleeding and cramping resolved.       Consults: mfm      Significant Diagnostic Studies: u/s confirms complete previa CL 6.7    Final Diagnoses:    Principal Problem: Vaginal bleeding during pregnancy   Secondary Diagnoses:   Active Hospital Problems    Diagnosis  POA    *Vaginal bleeding during pregnancy [O46.90]  Yes      Resolved Hospital Problems    Diagnosis Date Resolved POA   No resolved problems to display.       Discharged Condition: good    Disposition: Home or Self Care    Follow Up/Patient Instructions:  Pelvic rest  Return if bleeding    Medications:  Reconciled Home Medications:      Medication List      CONTINUE taking these medications    doxylamine succinate 25 mg tablet  Commonly known as:  UNISOM (DOXYLAMINE)  Take 1 tablet (25 mg total) by mouth nightly as needed for Insomnia.     ginger (Zingiber officinalis) 550 mg Cap  Take 1 capsule by mouth once daily.     ondansetron 4 MG tablet  Commonly known as:  ZOFRAN  Take 1 tablet (4 mg total) by mouth every 8 (eight) hours as needed for Nausea.     prenatal vit103-iron fum-folic 27 mg iron- 1 mg Tab  Commonly known as:    Take 1 tablet by mouth once daily.     pyridoxine (vitamin B6) 25 MG Tab  Commonly known as:  B-6  Take 1 tablet (25 mg total) by mouth once daily.            Discharge Procedure Orders  Other restrictions (specify):   Order Comments: Complete pelvic rest       Follow-up Information     Follow up In 1 week.

## 2018-06-22 NOTE — NURSING
9198 - Report received of  at 25 weeks 4 days admitted for monitoring of vaginal bleeding from ROSY Rachel. Care assumed. Full assessment done, history and medications reviewed with pt. Pt and family updated on plan of care with questions answered. Bed in low locked position, call bell in reach.    1718 - Report given to oncoming shift

## 2018-07-03 ENCOUNTER — HOSPITAL ENCOUNTER (OUTPATIENT)
Facility: HOSPITAL | Age: 28
Discharge: HOME | End: 2018-07-04
Attending: OBSTETRICS & GYNECOLOGY | Admitting: OBSTETRICS & GYNECOLOGY
Payer: MEDICAID

## 2018-07-03 DIAGNOSIS — O46.90 VAGINAL BLEEDING DURING PREGNANCY: ICD-10-CM

## 2018-07-03 PROCEDURE — 25000003 PHARM REV CODE 250: Performed by: OBSTETRICS & GYNECOLOGY

## 2018-07-03 PROCEDURE — 99211 OFF/OP EST MAY X REQ PHY/QHP: CPT

## 2018-07-03 RX ORDER — ACETAMINOPHEN 325 MG/1
650 TABLET ORAL EVERY 6 HOURS PRN
Status: DISCONTINUED | OUTPATIENT
Start: 2018-07-03 | End: 2018-07-04 | Stop reason: HOSPADM

## 2018-07-03 RX ADMIN — ACETAMINOPHEN 650 MG: 325 TABLET, FILM COATED ORAL at 09:07

## 2018-07-04 VITALS
TEMPERATURE: 99 F | BODY MASS INDEX: 32.43 KG/M2 | HEART RATE: 84 BPM | RESPIRATION RATE: 16 BRPM | SYSTOLIC BLOOD PRESSURE: 110 MMHG | HEIGHT: 63 IN | DIASTOLIC BLOOD PRESSURE: 64 MMHG | WEIGHT: 183 LBS

## 2018-07-04 PROCEDURE — 25000003 PHARM REV CODE 250: Performed by: OBSTETRICS & GYNECOLOGY

## 2018-07-04 RX ADMIN — ACETAMINOPHEN 650 MG: 325 TABLET, FILM COATED ORAL at 07:07

## 2018-07-04 NOTE — DISCHARGE INSTRUCTIONS
1 Dolor de wade intenso que no se kajal con ian dosis de Tylenol.    2 Vision borrosa o jaylyn manchas barbara de livier ojos.    3 Ninchazon repentino en la emmanuel o katia.     4 Aumento de peso en solo unos pocos gaines.    5 Dolor de estomago o calambres.    6 Vomito que dura mas de 24 horas.    7 Fiebre mas de 100.4 grados.    8 Sangrado vaginal que es algo mas que manonar.    9 Secrecion vaginal excesiva e in usual.    10 Un flujo de liquido acvoso de la vagina.    11 Avsencia de movmiento del ivette significohiva comenzando en 24-36 semanas.    12 Menos de 37 semanas: mas de 4 contracciones en ian hora por 2 horas    13 Mas de 37 semanas: contracciones coda 5 minutos por 2 horas.    14 Jasper 8-10 vasos.    Sequimiento con johnson medico cadacita.

## 2018-07-04 NOTE — H&P
DATE OF ADMISSION:  2018.    CHIEF COMPLAINT:  Small amount of vaginal bleeding.    HISTORY OF PRESENT ILLNESS:  Ms. Colemna is a 27-year-old  2, para 1, at   27 and 2/7th weeks' gestation who has a history of a complete placenta previa.    This patient was hospitalized two weeks ago and received betamethasone series   due to vaginal bleeding due to this problem.  The patient presented to Labor and   Delivery and showed pictures of some bloody mucus.  She was placed under   observation and carefully monitored overnight.  The patient reported good fetal   movement, no loss of fluid and per the nursing staff, vaginal bleeding   dissipated overnight.    PAST MEDICAL HISTORY:  None.    PAST SURGICAL HISTORY:  None.    OBSTETRIC HISTORY:   x1.    GYNECOLOGIC HISTORY:  None.    SOCIAL HISTORY:  Does not smoke, drink or take drugs.    FAMILY HISTORY:  No hereditary diseases or birth defects.    PHYSICAL EXAMINATION:  VITAL SIGNS:  Stable.  GENERAL:  The patient is afebrile.  CARDIOVASCULAR:  Regular rate and rhythm.  LUNGS:  Clear to auscultation bilaterally.  ABDOMEN:  Soft and nontender.  EXTREMITIES:  No clubbing, cyanosis or edema.  Fetal heart tones reactive.    Contractions none.    ASSESSMENT:  1.  Intrauterine pregnancy at 27 weeks' gestation.  2.  Placenta previa with small amount of vaginal bleeding.    PLAN:  As the patient is stable and has already received steroids, we will send   home with bleeding precautions.      SUMAYA  dd: 2018 06:54:22 (CDT)  td: 2018 07:29:20 (CDT)  Doc ID   #[id number]  Job ID #[id number]    CC:

## 2018-07-04 NOTE — NURSING
- Pt is  at 27 weeks 1 day arrived to L&D unit via wheelchair with complaints vaginal bleeding, picture shown of brown mucus looking spot on toilet tissue. Pt states had this 3 times today. Denies bright red bleeding. Denies pain other than mild back pain. Pt denies any urinary symptoms, ctx or leaking of fluid. +FM. Care assumed. Full assessment done, history and medications reviewed with pt. Pt updated on plan of care with questions answered. Bed in low locked position, call bell in reach. Pt placed on EFM and TOCO, automatic BP.  at bedside to translate.     - Call placed to Dr. Andres with pt status and arrival, Hx of complete previa, reassuring FHTs, no ctx noted. No bleeding at this time, picture from pt explained. Order to watch overnight. May have tylenol for headache.    0645 - Dr. Veras at bedside, updated on pt status, no bleeding overnight. Orders to discharge home on labor precautions and return to unit if bright red bleeding occurs.    0655 - Discharge instructions given per language line, questions answered, pt verbalized understanding.

## 2018-07-05 PROBLEM — O46.90 VAGINAL BLEEDING DURING PREGNANCY: Status: RESOLVED | Noted: 2018-06-19 | Resolved: 2018-07-05

## 2018-07-08 ENCOUNTER — HOSPITAL ENCOUNTER (OUTPATIENT)
Facility: HOSPITAL | Age: 28
Discharge: HOME | End: 2018-07-08
Attending: OBSTETRICS & GYNECOLOGY | Admitting: OBSTETRICS & GYNECOLOGY
Payer: MEDICAID

## 2018-07-08 VITALS
DIASTOLIC BLOOD PRESSURE: 66 MMHG | SYSTOLIC BLOOD PRESSURE: 110 MMHG | HEART RATE: 86 BPM | WEIGHT: 182 LBS | BODY MASS INDEX: 34.36 KG/M2 | HEIGHT: 61 IN | OXYGEN SATURATION: 97 %

## 2018-07-08 DIAGNOSIS — R10.9 ABDOMINAL PAIN: ICD-10-CM

## 2018-07-08 LAB
BILIRUB UR QL STRIP: NEGATIVE
CLARITY UR: CLEAR
COLOR UR: COLORLESS
GLUCOSE UR QL STRIP: NEGATIVE
HGB UR QL STRIP: NEGATIVE
KETONES UR QL STRIP: NEGATIVE
LEUKOCYTE ESTERASE UR QL STRIP: NEGATIVE
NITRITE UR QL STRIP: NEGATIVE
PH UR STRIP: 7 [PH] (ref 5–8)
PROT UR QL STRIP: NEGATIVE
SP GR UR STRIP: 1 (ref 1–1.03)
URN SPEC COLLECT METH UR: ABNORMAL
UROBILINOGEN UR STRIP-ACNC: NEGATIVE EU/DL

## 2018-07-08 PROCEDURE — 81003 URINALYSIS AUTO W/O SCOPE: CPT

## 2018-07-08 PROCEDURE — 99211 OFF/OP EST MAY X REQ PHY/QHP: CPT

## 2018-07-08 NOTE — DISCHARGE INSTRUCTIONS
Home Undelivered Discharge Instructions    After Discharge Orders:    Future Appointments  Date Time Provider Department Center   2018 2:15 PM ULTRASOUND SCHEDULE-[facility]   2018 3:25 PM Penelope Salinas MD [facility]       Call physician or midwife's office for any problems or concerns.    Current Discharge Medication List      CONTINUE these medications which have NOT CHANGED    Details   prenatal vit103-iron fum-folic () 27 mg iron- 1 mg Tab Take 1 tablet by mouth once daily.  Qty: 30 tablet, Refills: 11    Associated Diagnoses: Threatened                      · Diet:  normal diet as tolerated and drink at least 4-5 bottles of water per day    · Rest: normal activity as tolerated and no sex    Other instructions: Do kick counts once a day on your baby. Choose the time of day your baby is most active. Get in a comfortable lying or sitting position and time how long it takes to feel 10 kicks, twists, turns, swishes, or rolls. Call your physician or midwife if there have not been 10 kicks in 1.5 hours    Call physician or midwife, return to Labor and Delivery, call 911, or go to the nearest Emergency Room if: increased leakage or fluid, contractions more than  6 per  1 hour, decreased fetal movement, persistent low back pain or cramping, bleeding from vaginal area, difficulty urinating, pain with urination, difficulty breathing, new calf pain, persistent headache or vision change       En qué consiste la preeclampsia  La preeclampsia es un trastorno que puede ocurrir julia el embarazo. Puede provocar riesgos de kal para usted y para johnson bebé. La causa de la preeclampsia es desconocida, sergei suele desaparecer poco después de nicole a jose manuel.     Le revisarán la presión arterial con regularidad julia todo el embarazo para comprobar si presenta síntomas de preeclampsia.   Señales y síntomas  Essie señal común de preeclampsia es la presión arterial marilou. Entre otras señales y síntomas se  podrían encontrar los siguientes:  · Aumento rápido de peso (aproximadamente ian lizzie o medio kilo o más cada día)  · Proteína en la orina  · Dolor de wade  · Dolor abdominal en el lado derecho  · Problemas de visión (destellos o manchas)  · Edema (hinchazón) de johnson emmanuel o rosa katia (esto también ocurre comúnmente cerca del final de los embarazos normales, aun sin preeclampsia)  Pruebas que podría necesitar  Johnson proveedor de atención médica deberá comprobar johnson presión sanguínea julia todo el embarazo. Si la presión sanguínea sube demasiado, es posible que le janay las siguientes pruebas:  · Pruebas de orina para comprobar si contiene proteínas  · Análisis de latonya para confirmar la preeclampsia  · Monitorización fetal para asegurar que el bebé se encuentra en buen estado de kal  Tratamiento de la preeclampsia  La preeclampsia vini siempre desaparece poco después de nicole a jose manuel. La única yoanna es el parto. Hasta brian momento, el proveedor de atención médica podrá ayudarle a manejar johnson afección. Si rosa síntomas son moderados, es posible que sea suficiente con que descanse en casa. Si rosa síntomas son graves, tendrá que ser hospitalizada. El tratamiento en el hospital incluye lo siguiente:  · Reposo total en la cama para ayudar a controlar la presión sanguínea  · Administración de magnesio por vía intravenosa julia el trabajo de parto para prevenir las convulsiones  · Parto inducido o mediante cesárea para ayudarle a nicole a jose manuel más rápidamente  Cuándo debe llamar a johnson proveedor de atención médica  Llame a johnson proveedor de atención médica si la hinchazón, el aumento de peso u otros síntomas se presentan de forma muy rápida o son graves. Algunos casos de preeclampsia son más graves que otros. Rosa señales y síntomas también pueden cambiar o empeorar a medida que se aproxima la fecha del parto.  ¿Quiénes están en riesgo?  La preeclampsia puede ocurrir en cualquier marciano embarazada, sergei entre los factores que aumentan johnson  riesgo están:  · Embarazos anteriores. La preeclampsia, el retraso de crecimiento intrauterino, el parto prematuro, el desprendimiento de placenta o la muerte fetal.  · El historial de kal de la madre. La diabetes, la presión arterial marilou, la obesidad, las enfermedades de los riñones, las enfermedades autoinmunes (mratha el lupus), o un historial familiar de preeclampsia.  · Embarazo actual. El primer embarazo, múltiples fetos, tener más de 40 años o la fertilización in vitro.  Peligros de la preeclampsia  Si se palmira sin tratamiento, la preeclampsia puede causar problemas graves para usted y para johnson bebé. La placenta (el órgano a través del cual se alimenta el bebé) puede separarse de la pared uterina, lo cual puede provocar estrés fetal (el bebé tiene mayor riesgo de problemas de kal). O mane es posible que el bebé nazca prematuro o con peso demasiado bajo. La preeclampsia también puede causar los siguientes problemas de kal:  · Trastornos renales o daños a otros órganos  · Convulsiones  · Ataques cerebrales  Después del parto  En la mayoría de los casos, la preeclampsia desaparece por sí greg después del parto. A los pocos días, la presión arterial disminuirá y pronto desaparecerán también los otros síntomas de preeclampsia.  Date Last Reviewed: 9/29/2014  © 4061-4831 [employer]. [address] [place] [address], [place] [address]. Todos los derechos reservados. Esta información no pretende sustituir la atención médica profesional. Sólo johnson médico puede diagnosticar y tratar un problema de kal.

## 2018-07-08 NOTE — NURSING
Presents to the unit accompanied by s.o and son.  C/o lower back and abdominal pain, intermittent, 8/10 since last  Night.  Denies vaginal bleeding, leaking of fluid, or UTI symptoms.  Has a complete previa.  States drinks a lot of water per day.  Active fetal movement. reassuring fht's. Abdomen soft non tender.  toco with possible ctx, abodmen soft, no contraction palpated. Reviewed poc.   translating. Will report to Dr Salinas.     1045  Dr Salinas on unit, report given of above charted assessment with tracing reviewed. Orders rec'd to monitor x 2 hours, rb&v.  MD to bedside.     1305 report to Dr Salinas of pt with a few contractions, hardly can palpate, more like irritibility, up to bathroom and water given with contractions spaced out and appears to be stopped now.  Pt still c/o pain 6/10.  U/a collected.  Orders rec'd to send u/a to lab, if negative may discharge home,rb&v.      1400  U/a results negative.  Informed pt and spouse of discharge order.  Both agree with discharge.    1412  Discharge instructions reviewed with pt and spouse with verbal recall and to return for vaginal bleeding, leaking of fluid, or abdominal pain. Also encouraged to obtain maternity belt. Refused wheelchair.  Ambulated off unit to home.

## 2018-08-16 PROBLEM — R10.9 ABDOMINAL PAIN: Status: RESOLVED | Noted: 2018-07-08 | Resolved: 2018-08-16

## 2018-08-17 ENCOUNTER — ANESTHESIA EVENT (OUTPATIENT)
Dept: OBSTETRICS AND GYNECOLOGY | Facility: HOSPITAL | Age: 28
End: 2018-08-17
Payer: MEDICAID

## 2018-08-17 ENCOUNTER — ANESTHESIA (OUTPATIENT)
Dept: OBSTETRICS AND GYNECOLOGY | Facility: HOSPITAL | Age: 28
End: 2018-08-17
Payer: MEDICAID

## 2018-08-17 ENCOUNTER — HOSPITAL ENCOUNTER (INPATIENT)
Facility: HOSPITAL | Age: 28
LOS: 4 days | Discharge: HOME | End: 2018-08-21
Attending: OBSTETRICS & GYNECOLOGY | Admitting: OBSTETRICS & GYNECOLOGY
Payer: MEDICAID

## 2018-08-17 DIAGNOSIS — O44.13 COMPLETE PLACENTA PREVIA WITH HEMORRHAGE, THIRD TRIMESTER: Primary | ICD-10-CM

## 2018-08-17 DIAGNOSIS — O44.01 COMPLETE PLACENTA PREVIA NOS OR WITHOUT HEMORRHAGE, FIRST TRIMESTER: ICD-10-CM

## 2018-08-17 DIAGNOSIS — Z34.90 PREGNANCY: ICD-10-CM

## 2018-08-17 LAB
ABO + RH BLD: NORMAL
BASOPHILS # BLD AUTO: 0.01 K/UL
BASOPHILS # BLD AUTO: 0.02 K/UL
BASOPHILS NFR BLD: 0 %
BASOPHILS NFR BLD: 0.2 %
BLD GP AB SCN CELLS X3 SERPL QL: NORMAL
DIFFERENTIAL METHOD: ABNORMAL
DIFFERENTIAL METHOD: ABNORMAL
EOSINOPHIL # BLD AUTO: 0 K/UL
EOSINOPHIL # BLD AUTO: 0.2 K/UL
EOSINOPHIL NFR BLD: 0 %
EOSINOPHIL NFR BLD: 1.9 %
ERYTHROCYTE [DISTWIDTH] IN BLOOD BY AUTOMATED COUNT: 13.7 %
ERYTHROCYTE [DISTWIDTH] IN BLOOD BY AUTOMATED COUNT: 13.9 %
HCT VFR BLD AUTO: 31.7 %
HCT VFR BLD AUTO: 34.7 %
HGB BLD-MCNC: 10.8 G/DL
HGB BLD-MCNC: 12.2 G/DL
LYMPHOCYTES # BLD AUTO: 1 K/UL
LYMPHOCYTES # BLD AUTO: 2.2 K/UL
LYMPHOCYTES NFR BLD: 20.3 %
LYMPHOCYTES NFR BLD: 4.7 %
MCH RBC QN AUTO: 27.4 PG
MCH RBC QN AUTO: 27.7 PG
MCHC RBC AUTO-ENTMCNC: 34.1 G/DL
MCHC RBC AUTO-ENTMCNC: 35.2 G/DL
MCV RBC AUTO: 79 FL
MCV RBC AUTO: 81 FL
MONOCYTES # BLD AUTO: 0.8 K/UL
MONOCYTES # BLD AUTO: 1 K/UL
MONOCYTES NFR BLD: 4.1 %
MONOCYTES NFR BLD: 8.9 %
NEUTROPHILS # BLD AUTO: 18.3 K/UL
NEUTROPHILS # BLD AUTO: 7.5 K/UL
NEUTROPHILS NFR BLD: 67.9 %
NEUTROPHILS NFR BLD: 90.6 %
PLATELET # BLD AUTO: 218 K/UL
PLATELET # BLD AUTO: 228 K/UL
PMV BLD AUTO: 11.2 FL
PMV BLD AUTO: 11.4 FL
RBC # BLD AUTO: 3.94 M/UL
RBC # BLD AUTO: 4.41 M/UL
WBC # BLD AUTO: 11.03 K/UL
WBC # BLD AUTO: 20.23 K/UL

## 2018-08-17 PROCEDURE — 86592 SYPHILIS TEST NON-TREP QUAL: CPT

## 2018-08-17 PROCEDURE — 25000003 PHARM REV CODE 250: Performed by: ANESTHESIOLOGY

## 2018-08-17 PROCEDURE — 25000003 PHARM REV CODE 250: Performed by: OBSTETRICS & GYNECOLOGY

## 2018-08-17 PROCEDURE — 63600175 PHARM REV CODE 636 W HCPCS: Performed by: ANESTHESIOLOGY

## 2018-08-17 PROCEDURE — 63600175 PHARM REV CODE 636 W HCPCS: Performed by: OBSTETRICS & GYNECOLOGY

## 2018-08-17 PROCEDURE — 88307 TISSUE EXAM BY PATHOLOGIST: CPT | Performed by: PATHOLOGY

## 2018-08-17 PROCEDURE — S0028 INJECTION, FAMOTIDINE, 20 MG: HCPCS | Performed by: ANESTHESIOLOGY

## 2018-08-17 PROCEDURE — 37000009 HC ANESTHESIA EA ADD 15 MINS: Performed by: OBSTETRICS & GYNECOLOGY

## 2018-08-17 PROCEDURE — 99211 OFF/OP EST MAY X REQ PHY/QHP: CPT | Mod: 25

## 2018-08-17 PROCEDURE — 11000001 HC ACUTE MED/SURG PRIVATE ROOM

## 2018-08-17 PROCEDURE — 85025 COMPLETE CBC W/AUTO DIFF WBC: CPT

## 2018-08-17 PROCEDURE — 37000008 HC ANESTHESIA 1ST 15 MINUTES: Performed by: OBSTETRICS & GYNECOLOGY

## 2018-08-17 PROCEDURE — 36000684 HC CESAREAN SECTION, UNSCHEDULED

## 2018-08-17 PROCEDURE — 51702 INSERT TEMP BLADDER CATH: CPT

## 2018-08-17 PROCEDURE — 96361 HYDRATE IV INFUSION ADD-ON: CPT

## 2018-08-17 PROCEDURE — 36415 COLL VENOUS BLD VENIPUNCTURE: CPT

## 2018-08-17 PROCEDURE — 59514 CESAREAN DELIVERY ONLY: CPT | Mod: ANES,,, | Performed by: ANESTHESIOLOGY

## 2018-08-17 PROCEDURE — 86901 BLOOD TYPING SEROLOGIC RH(D): CPT

## 2018-08-17 PROCEDURE — 25000003 PHARM REV CODE 250: Performed by: NURSE ANESTHETIST, CERTIFIED REGISTERED

## 2018-08-17 PROCEDURE — 27200688 HC TRAY, SPINAL-HYPER/ ISOBARIC: Performed by: ANESTHESIOLOGY

## 2018-08-17 PROCEDURE — 96360 HYDRATION IV INFUSION INIT: CPT

## 2018-08-17 PROCEDURE — 88307 TISSUE EXAM BY PATHOLOGIST: CPT | Mod: 26,,, | Performed by: PATHOLOGY

## 2018-08-17 PROCEDURE — 63600175 PHARM REV CODE 636 W HCPCS: Performed by: NURSE ANESTHETIST, CERTIFIED REGISTERED

## 2018-08-17 PROCEDURE — S0020 INJECTION, BUPIVICAINE HYDRO: HCPCS | Performed by: ANESTHESIOLOGY

## 2018-08-17 PROCEDURE — 59514 CESAREAN DELIVERY ONLY: CPT | Mod: CRNA,,, | Performed by: NURSE ANESTHETIST, CERTIFIED REGISTERED

## 2018-08-17 RX ORDER — KETOROLAC TROMETHAMINE 30 MG/ML
30 INJECTION, SOLUTION INTRAMUSCULAR; INTRAVENOUS EVERY 6 HOURS
Status: DISPENSED | OUTPATIENT
Start: 2018-08-17 | End: 2018-08-18

## 2018-08-17 RX ORDER — MUPIROCIN 20 MG/G
OINTMENT TOPICAL
Status: DISCONTINUED | OUTPATIENT
Start: 2018-08-17 | End: 2018-08-21 | Stop reason: HOSPADM

## 2018-08-17 RX ORDER — METOCLOPRAMIDE HYDROCHLORIDE 5 MG/ML
INJECTION INTRAMUSCULAR; INTRAVENOUS
Status: DISCONTINUED | OUTPATIENT
Start: 2018-08-17 | End: 2018-08-17

## 2018-08-17 RX ORDER — OXYTOCIN/RINGER'S LACTATE 20/1000 ML
41.65 PLASTIC BAG, INJECTION (ML) INTRAVENOUS CONTINUOUS
Status: ACTIVE | OUTPATIENT
Start: 2018-08-17 | End: 2018-08-17

## 2018-08-17 RX ORDER — OXYTOCIN 10 [USP'U]/ML
INJECTION, SOLUTION INTRAMUSCULAR; INTRAVENOUS
Status: DISCONTINUED | OUTPATIENT
Start: 2018-08-17 | End: 2018-08-17

## 2018-08-17 RX ORDER — ACETAMINOPHEN 325 MG/1
650 TABLET ORAL EVERY 6 HOURS PRN
Status: DISCONTINUED | OUTPATIENT
Start: 2018-08-17 | End: 2018-08-21 | Stop reason: HOSPADM

## 2018-08-17 RX ORDER — HYDROCORTISONE 25 MG/G
CREAM TOPICAL 3 TIMES DAILY PRN
Status: DISCONTINUED | OUTPATIENT
Start: 2018-08-17 | End: 2018-08-21 | Stop reason: HOSPADM

## 2018-08-17 RX ORDER — OXYCODONE AND ACETAMINOPHEN 5; 325 MG/1; MG/1
1 TABLET ORAL EVERY 4 HOURS PRN
Status: DISCONTINUED | OUTPATIENT
Start: 2018-08-17 | End: 2018-08-21 | Stop reason: HOSPADM

## 2018-08-17 RX ORDER — SIMETHICONE 80 MG
1 TABLET,CHEWABLE ORAL EVERY 6 HOURS PRN
Status: DISCONTINUED | OUTPATIENT
Start: 2018-08-17 | End: 2018-08-21 | Stop reason: HOSPADM

## 2018-08-17 RX ORDER — MAGNESIUM SULFATE HEPTAHYDRATE 40 MG/ML
4 INJECTION, SOLUTION INTRAVENOUS ONCE
Status: COMPLETED | OUTPATIENT
Start: 2018-08-17 | End: 2018-08-17

## 2018-08-17 RX ORDER — BETAMETHASONE SODIUM PHOSPHATE AND BETAMETHASONE ACETATE 3; 3 MG/ML; MG/ML
12 INJECTION, SUSPENSION INTRA-ARTICULAR; INTRALESIONAL; INTRAMUSCULAR; SOFT TISSUE EVERY 24 HOURS
Status: DISCONTINUED | OUTPATIENT
Start: 2018-08-17 | End: 2018-08-17

## 2018-08-17 RX ORDER — MISOPROSTOL 200 UG/1
800 TABLET ORAL
Status: DISCONTINUED | OUTPATIENT
Start: 2018-08-17 | End: 2018-08-21 | Stop reason: HOSPADM

## 2018-08-17 RX ORDER — PHENYLEPHRINE HYDROCHLORIDE 10 MG/ML
INJECTION INTRAVENOUS
Status: DISCONTINUED | OUTPATIENT
Start: 2018-08-17 | End: 2018-08-17

## 2018-08-17 RX ORDER — BUPIVACAINE HYDROCHLORIDE 7.5 MG/ML
INJECTION, SOLUTION EPIDURAL; RETROBULBAR
Status: COMPLETED | OUTPATIENT
Start: 2018-08-17 | End: 2018-08-17

## 2018-08-17 RX ORDER — FAMOTIDINE 10 MG/ML
20 INJECTION INTRAVENOUS ONCE
Status: COMPLETED | OUTPATIENT
Start: 2018-08-17 | End: 2018-08-17

## 2018-08-17 RX ORDER — AMOXICILLIN 250 MG
1 CAPSULE ORAL NIGHTLY PRN
Status: DISCONTINUED | OUTPATIENT
Start: 2018-08-17 | End: 2018-08-21 | Stop reason: HOSPADM

## 2018-08-17 RX ORDER — FENTANYL CITRATE 50 UG/ML
INJECTION, SOLUTION INTRAMUSCULAR; INTRAVENOUS
Status: DISCONTINUED | OUTPATIENT
Start: 2018-08-17 | End: 2018-08-17

## 2018-08-17 RX ORDER — HYDROMORPHONE HCL IN 0.9% NACL 6 MG/30 ML
PATIENT CONTROLLED ANALGESIA SYRINGE INTRAVENOUS CONTINUOUS
Status: DISCONTINUED | OUTPATIENT
Start: 2018-08-17 | End: 2018-08-21 | Stop reason: HOSPADM

## 2018-08-17 RX ORDER — SODIUM CITRATE AND CITRIC ACID MONOHYDRATE 334; 500 MG/5ML; MG/5ML
30 SOLUTION ORAL ONCE
Status: COMPLETED | OUTPATIENT
Start: 2018-08-17 | End: 2018-08-17

## 2018-08-17 RX ORDER — NALOXONE HCL 0.4 MG/ML
0.02 VIAL (ML) INJECTION
Status: DISCONTINUED | OUTPATIENT
Start: 2018-08-17 | End: 2018-08-21 | Stop reason: HOSPADM

## 2018-08-17 RX ORDER — BISACODYL 10 MG
10 SUPPOSITORY, RECTAL RECTAL ONCE AS NEEDED
Status: ACTIVE | OUTPATIENT
Start: 2018-08-17 | End: 2018-08-17

## 2018-08-17 RX ORDER — ONDANSETRON 2 MG/ML
4 INJECTION INTRAMUSCULAR; INTRAVENOUS EVERY 12 HOURS PRN
Status: DISCONTINUED | OUTPATIENT
Start: 2018-08-17 | End: 2018-08-21 | Stop reason: HOSPADM

## 2018-08-17 RX ORDER — ONDANSETRON 2 MG/ML
4 INJECTION INTRAMUSCULAR; INTRAVENOUS EVERY 6 HOURS PRN
Status: DISCONTINUED | OUTPATIENT
Start: 2018-08-17 | End: 2018-08-21 | Stop reason: HOSPADM

## 2018-08-17 RX ORDER — IBUPROFEN 600 MG/1
600 TABLET ORAL EVERY 6 HOURS
Status: DISCONTINUED | OUTPATIENT
Start: 2018-08-18 | End: 2018-08-21 | Stop reason: HOSPADM

## 2018-08-17 RX ORDER — SODIUM CHLORIDE, SODIUM LACTATE, POTASSIUM CHLORIDE, CALCIUM CHLORIDE 600; 310; 30; 20 MG/100ML; MG/100ML; MG/100ML; MG/100ML
INJECTION, SOLUTION INTRAVENOUS CONTINUOUS
Status: DISCONTINUED | OUTPATIENT
Start: 2018-08-17 | End: 2018-08-21 | Stop reason: HOSPADM

## 2018-08-17 RX ORDER — OXYCODONE AND ACETAMINOPHEN 10; 325 MG/1; MG/1
1 TABLET ORAL EVERY 4 HOURS PRN
Status: DISCONTINUED | OUTPATIENT
Start: 2018-08-17 | End: 2018-08-21 | Stop reason: HOSPADM

## 2018-08-17 RX ORDER — ACETAMINOPHEN 10 MG/ML
INJECTION, SOLUTION INTRAVENOUS
Status: DISCONTINUED | OUTPATIENT
Start: 2018-08-17 | End: 2018-08-17

## 2018-08-17 RX ORDER — MISOPROSTOL 200 UG/1
200 TABLET ORAL ONCE AS NEEDED
Status: ACTIVE | OUTPATIENT
Start: 2018-08-17 | End: 2018-08-17

## 2018-08-17 RX ORDER — CEFAZOLIN SODIUM 2 G/50ML
2 SOLUTION INTRAVENOUS ONCE
Status: COMPLETED | OUTPATIENT
Start: 2018-08-17 | End: 2018-08-17

## 2018-08-17 RX ORDER — EPHEDRINE SULFATE 50 MG/ML
INJECTION, SOLUTION INTRAVENOUS
Status: DISCONTINUED | OUTPATIENT
Start: 2018-08-17 | End: 2018-08-17

## 2018-08-17 RX ORDER — MIDAZOLAM HYDROCHLORIDE 1 MG/ML
INJECTION INTRAMUSCULAR; INTRAVENOUS
Status: DISCONTINUED | OUTPATIENT
Start: 2018-08-17 | End: 2018-08-17

## 2018-08-17 RX ORDER — MAGNESIUM SULFATE HEPTAHYDRATE 40 MG/ML
2 INJECTION, SOLUTION INTRAVENOUS CONTINUOUS
Status: DISCONTINUED | OUTPATIENT
Start: 2018-08-17 | End: 2018-08-21 | Stop reason: HOSPADM

## 2018-08-17 RX ORDER — DIPHENHYDRAMINE HCL 25 MG
25 CAPSULE ORAL EVERY 4 HOURS PRN
Status: DISCONTINUED | OUTPATIENT
Start: 2018-08-17 | End: 2018-08-21 | Stop reason: HOSPADM

## 2018-08-17 RX ADMIN — MAGNESIUM SULFATE IN WATER 2 G/HR: 40 INJECTION, SOLUTION INTRAVENOUS at 08:08

## 2018-08-17 RX ADMIN — CEFAZOLIN SODIUM 2 G: 2 SOLUTION INTRAVENOUS at 09:08

## 2018-08-17 RX ADMIN — Medication: at 02:08

## 2018-08-17 RX ADMIN — ONDANSETRON HYDROCHLORIDE 4 MG: 2 INJECTION, SOLUTION INTRAMUSCULAR; INTRAVENOUS at 06:08

## 2018-08-17 RX ADMIN — PHENYLEPHRINE HYDROCHLORIDE 200 MCG: 10 INJECTION INTRAVENOUS at 09:08

## 2018-08-17 RX ADMIN — BETAMETHASONE SODIUM PHOSPHATE AND BETAMETHASONE ACETATE 12 MG: 3; 3 INJECTION, SUSPENSION INTRA-ARTICULAR; INTRALESIONAL; INTRAMUSCULAR at 05:08

## 2018-08-17 RX ADMIN — FAMOTIDINE 20 MG: 10 INJECTION, SOLUTION INTRAVENOUS at 09:08

## 2018-08-17 RX ADMIN — FENTANYL CITRATE 10 MCG: 50 INJECTION, SOLUTION INTRAMUSCULAR; INTRAVENOUS at 09:08

## 2018-08-17 RX ADMIN — AMPICILLIN SODIUM 2 G: 2 INJECTION, POWDER, FOR SOLUTION INTRAMUSCULAR; INTRAVENOUS at 08:08

## 2018-08-17 RX ADMIN — METOCLOPRAMIDE HYDROCHLORIDE 10 MG: 5 INJECTION INTRAMUSCULAR; INTRAVENOUS at 09:08

## 2018-08-17 RX ADMIN — EPHEDRINE SULFATE 10 MG: 50 INJECTION, SOLUTION INTRAMUSCULAR; INTRAVENOUS; SUBCUTANEOUS at 09:08

## 2018-08-17 RX ADMIN — Medication: at 08:08

## 2018-08-17 RX ADMIN — FENTANYL CITRATE 90 MCG: 50 INJECTION, SOLUTION INTRAMUSCULAR; INTRAVENOUS at 09:08

## 2018-08-17 RX ADMIN — ACETAMINOPHEN 1000 MG: 10 INJECTION, SOLUTION INTRAVENOUS at 10:08

## 2018-08-17 RX ADMIN — MIDAZOLAM HYDROCHLORIDE 2 MG: 1 INJECTION, SOLUTION INTRAMUSCULAR; INTRAVENOUS at 10:08

## 2018-08-17 RX ADMIN — SODIUM CHLORIDE, SODIUM LACTATE, POTASSIUM CHLORIDE, AND CALCIUM CHLORIDE 1000 ML: .6; .31; .03; .02 INJECTION, SOLUTION INTRAVENOUS at 04:08

## 2018-08-17 RX ADMIN — SODIUM CHLORIDE, SODIUM LACTATE, POTASSIUM CHLORIDE, AND CALCIUM CHLORIDE: .6; .31; .03; .02 INJECTION, SOLUTION INTRAVENOUS at 10:08

## 2018-08-17 RX ADMIN — OXYTOCIN 30 UNITS: 10 INJECTION, SOLUTION INTRAMUSCULAR; INTRAVENOUS at 09:08

## 2018-08-17 RX ADMIN — Medication: at 11:08

## 2018-08-17 RX ADMIN — BUPIVACAINE HYDROCHLORIDE 1.6 ML: 7.5 INJECTION, SOLUTION EPIDURAL; RETROBULBAR at 10:08

## 2018-08-17 RX ADMIN — PHENYLEPHRINE HYDROCHLORIDE 200 MCG: 10 INJECTION INTRAVENOUS at 10:08

## 2018-08-17 RX ADMIN — MAGNESIUM SULFATE IN WATER 4 G/HR: 40 INJECTION, SOLUTION INTRAVENOUS at 08:08

## 2018-08-17 RX ADMIN — SODIUM CHLORIDE, SODIUM LACTATE, POTASSIUM CHLORIDE, AND CALCIUM CHLORIDE: .6; .31; .03; .02 INJECTION, SOLUTION INTRAVENOUS at 09:08

## 2018-08-17 RX ADMIN — KETOROLAC TROMETHAMINE 30 MG: 30 INJECTION, SOLUTION INTRAMUSCULAR at 05:08

## 2018-08-17 RX ADMIN — SODIUM CITRATE AND CITRIC ACID MONOHYDRATE 30 ML: 500; 334 SOLUTION ORAL at 09:08

## 2018-08-17 RX ADMIN — MUPIROCIN: 20 OINTMENT TOPICAL at 09:08

## 2018-08-17 RX ADMIN — SODIUM CHLORIDE, SODIUM LACTATE, POTASSIUM CHLORIDE, AND CALCIUM CHLORIDE: .6; .31; .03; .02 INJECTION, SOLUTION INTRAVENOUS at 05:08

## 2018-08-17 NOTE — PROGRESS NOTES
Spoke with pt via jayce  She reports some pain and increased bleeding  +fM  .fht reactive  Ctx q3-8  Pad on bed changed x 3 this am with large amount blood    Mag started this am with ctx on monitor and pt noting what sounds like ctx but bleeding has continued for last few hours  Has had bmz course  Will go ahead with section.  Pt understands and agrees

## 2018-08-17 NOTE — TRANSFER OF CARE
"Anesthesia Transfer of Care Note    Patient: Hayley Coleman    Procedure(s) Performed: Procedure(s) (LRB):   SECTION (N/A)    Patient location: Labor and Delivery    Anesthesia Type: spinal    Transport from OR: Transported from OR on room air with adequate spontaneous ventilation    Post pain: adequate analgesia    Post assessment: no apparent anesthetic complications and tolerated procedure well    Post vital signs: stable    Level of consciousness: awake, alert and oriented    Nausea/Vomiting: no nausea/vomiting    Transfer of care protocol was followed      Last vitals:   Visit Vitals  /63   Pulse 87   Temp 36.9 °C (98.5 °F) (Oral)   Resp 18   Ht 5' 4" (1.626 m)   Wt 88.9 kg (196 lb)   LMP 2017 (Exact Date)   SpO2 (!) 93%   Breastfeeding? No   BMI 33.64 kg/m²     "

## 2018-08-17 NOTE — TREATMENT PLAN
Pt transferred to pp room via stretcher with nad noted. Transferred to pp bed with minimal assistance. pericare completed, green pads placed. Dunn emptied (see flowsheet). Pp nurse at bedside to receive pt.  Epidural handoff completed with BRI Jose RN

## 2018-08-17 NOTE — ANESTHESIA POSTPROCEDURE EVALUATION
"Anesthesia Post Evaluation    Patient: Hayley Coleman    Procedure(s) Performed: Procedure(s) (LRB):   SECTION (N/A)    Final Anesthesia Type: spinal  Patient location during evaluation: labor & delivery  Patient participation: Yes- Able to Participate  Level of consciousness: awake and alert and oriented  Post-procedure vital signs: reviewed and stable  Pain management: adequate  Airway patency: patent  PONV status at discharge: No PONV  Anesthetic complications: no      Cardiovascular status: blood pressure returned to baseline, hemodynamically stable and tachycardic  Respiratory status: unassisted, spontaneous ventilation and room air  Hydration status: euvolemic  Follow-up not needed.        Visit Vitals  /63   Pulse (!) 127   Temp 36.5 °C (97.7 °F)   Resp 18   Ht 5' 4" (1.626 m)   Wt 88.9 kg (196 lb)   LMP 2017 (Exact Date)   SpO2 98%   Breastfeeding? Unknown   BMI 33.64 kg/m²       Pain/Viktoria Score: No Data Recorded      "

## 2018-08-17 NOTE — H&P
[]Hide copied text    []Veronica for details      `History and Physical  Obstetrics   copied from Dr Soto h&p labelled as progress note     SUBJECTIVE:      Hayley Coleman is a 27 y.o.  female at 33w4d  admitted for vaginal bleeding due to placenta previa. Pt c/o occas contractions. Positive FM.          PTA Medications   Medication Sig    prenatal vit103-iron fum-folic () 27 mg iron- 1 mg Tab Take 1 tablet by mouth once daily.         Current Facility-Administered Medications:     betamethasone acetate-betamethasone sodium phosphate injection 12 mg, 12 mg, Intramuscular, Daily, Penelope Salinas MD, 12 mg at 18 0527    lactated ringers bolus 1,000 mL, 1,000 mL, Intravenous, Once, Penelope Salinas MD, 1,000 mL at 18 0430    lactated ringers infusion, , Intravenous, Continuous, Penelope Salinas MD        Review of patient's allergies indicates:  No Known Allergies   Obstetric History       T1      L1     SAB0   TAB0   Ectopic0   Multiple0   Live Births1        # Outcome Date GA Lbr Redd/2nd Weight Sex Delivery Anes PTL Lv   2 Current                     1 Term      3.629 kg (8 lb) M Vag-Spont     EDGAR      Complications: Preeclampsia          No past medical history on file.  No past surgical history on file.        Family History   Problem Relation Age of Onset    Breast cancer Neg Hx      Colon cancer Neg Hx      Ovarian cancer Neg Hx        Social History           Tobacco Use    Smoking status: Never Smoker    Smokeless tobacco: Never Used   Substance Use Topics    Alcohol use: No    Drug use: No         OBJECTIVE:      Vital Signs (Most Recent)  Temp:  [99.3 °F (37.4 °C)] 99.3 °F (37.4 °C)  Pulse:  [] 100  Resp:  [18] 18  BP: (116-132)/(67-83) 121/68     Physical Exam:         General:  Normal, alert and oriented      Physical Exam:  Gen: Alert and Oriented x 3, NAD, Well nourished female  Neck: supple, No LAD  Cv: Regular rate  Lungs: Normal respiratory  effort  Extremeties: Nt, no significant assymetric swelling                                    Abdomen: Soft gravid no FT    Uterine Size:  c/w dates    Presentations:  vertex    FHT: Reviewed, Cat 1   Pelvis: NEFG   Cervix:       Dilation:  closed visibly, no active bleeding from os     Effacement: 50%     Station:          10 cc blood clot noted in vagina              Laboratory:  H/H: 12.2/34.7     ASSESSMENT/PLAN:      26 yo  with IUP at 33w4d gestation.  1. Placenta previa with bleeding- bleeding is light currently, no active bleeding from os on exam. Will monitor for labor. pt received steroids mid-. Started rescue steroids this a.m. Cbc stable.   2. Fibroids- largest is 3 cm posterior.  3. Abn maternal screening- low risk with subsequent cell free dna testing.   4. Fetal status- u/s results pending. Cat 1. NICU consult ordered

## 2018-08-17 NOTE — ANESTHESIA PROCEDURE NOTES
Spinal    Diagnosis: IUP  Patient location during procedure: OR  Start time: 8/17/2018 9:34 AM  Timeout: 8/17/2018 9:32 AM  End time: 8/17/2018 9:37 AM  Staffing  Anesthesiologist: Robyn Lind MD  Performed: anesthesiologist   Preanesthetic Checklist  Completed: patient identified, site marked, surgical consent, pre-op evaluation, timeout performed, IV checked, risks and benefits discussed and monitors and equipment checked  Spinal Block  Patient position: sitting  Prep: ChloraPrep  Patient monitoring: heart rate  Approach: midline  Location: L3-4  Injection technique: single shot  CSF Fluid: clear free-flowing CSF  Needle  Needle type: pencil-tip   Needle gauge: 25 G  Needle length: 3.5 in  Additional Documentation: incremental injection, no paresthesia on injection and negative aspiration for heme  Needle localization: anatomical landmarks  Assessment  Ease of block: easy  Patient's tolerance of the procedure: no complaints and comfortable throughout block

## 2018-08-17 NOTE — TREATMENT PLAN
Dr Nuñez at bedside. peripad with moderate amount of bleeding noted. Pads changed. Julia at bedside for translation # [id number]. Pt c/o lower abdominal pain and cramps, rated 8/10. Pt made aware of plan to start mag and antibiotics now and will cont to monitor bleeding. If bleeding worsens, will have to deliver baby via C/S. Pt and significant other verbalized understanding. Questions answered. Will cont  To monitor.

## 2018-08-17 NOTE — CONSULTS
Consulted by Obstetrician to  mom about prematurity.    Mother's history reviewed (including H&P and progress notes).  ________________________________________  ·   ________________________________________  Above copied from OB notes.    Talked to mom about prematurity and 34 wks GA baby.  I discussed in detail about possible complications, need for ventilator b/c of immature lungs and IV antibiotics for possible sepsis.   Discussed increased morbidity and mortality as compared to term babies.  Discussed developmental concerns and stay in NICU till near due date.   Provided information about NICU.    Mom verbalized understanding.    Please feel free to call us if mom has any other questions.     Thanks for the consult.

## 2018-08-17 NOTE — PROGRESS NOTES
`History and Physical  Obstetrics      SUBJECTIVE:     Hayley Coleman is a 27 y.o.  female at 33w4d  admitted for vaginal bleeding due to placenta previa. Pt c/o occas contractions. Positive FM.    PTA Medications   Medication Sig    prenatal vit103-iron fum-folic () 27 mg iron- 1 mg Tab Take 1 tablet by mouth once daily.       Current Facility-Administered Medications:     betamethasone acetate-betamethasone sodium phosphate injection 12 mg, 12 mg, Intramuscular, Daily, Penelope Salinas MD, 12 mg at 18 0527    lactated ringers bolus 1,000 mL, 1,000 mL, Intravenous, Once, Penelope Salinas MD, 1,000 mL at 18 0430    lactated ringers infusion, , Intravenous, Continuous, Penelope Salinas MD      Review of patient's allergies indicates:  No Known Allergies   Obstetric History       T1      L1     SAB0   TAB0   Ectopic0   Multiple0   Live Births1       # Outcome Date GA Lbr Redd/2nd Weight Sex Delivery Anes PTL Lv   2 Current            1 Term    3.629 kg (8 lb) M Vag-Spont   EDGAR      Complications: Preeclampsia        No past medical history on file.  No past surgical history on file.  Family History   Problem Relation Age of Onset    Breast cancer Neg Hx     Colon cancer Neg Hx     Ovarian cancer Neg Hx      Social History     Tobacco Use    Smoking status: Never Smoker    Smokeless tobacco: Never Used   Substance Use Topics    Alcohol use: No    Drug use: No        OBJECTIVE:     Vital Signs (Most Recent)  Temp:  [99.3 °F (37.4 °C)] 99.3 °F (37.4 °C)  Pulse:  [] 100  Resp:  [18] 18  BP: (116-132)/(67-83) 121/68    Physical Exam:  General:  Normal, alert and oriented    Physical Exam:  Gen: Alert and Oriented x 3, NAD, Well nourished female  Neck: supple, No LAD  Cv: Regular rate  Lungs: Normal respiratory effort  Extremeties: Nt, no significant assymetric swelling                      Abdomen: Soft gravid no FT   Uterine Size:  c/w dates   Presentations:  vertex   FHT:  Reviewed, Cat 1   Pelvis: NEFG   Cervix:     Dilation:  closed visibly, no active bleeding from os    Effacement: 50%    Station:       10 cc blood clot noted in vagina          Laboratory:  H/H: 12.2/34.7    ASSESSMENT/PLAN:     26 yo  with IUP at 33w4d gestation.  1. Placenta previa with bleeding- bleeding is light currently, no active bleeding from os on exam. Will monitor for labor. pt received steroids mid-. Started rescue steroids this a.m. Cbc stable.   2. Fibroids- largest is 3 cm posterior.  3. Abn maternal screening- low risk with subsequent cell free dna testing.   4. Fetal status- u/s results pending. Cat 1. NICU consult ordered

## 2018-08-17 NOTE — PLAN OF CARE
Problem: Patient Care Overview  Goal: Plan of Care Review  Outcome: Ongoing (interventions implemented as appropriate)  Pt resting well in bed, states relief in pain with Dilaudid PCA. Family member at bedside and question answered with rationale. Pt indu-care done and bleeding monitored with pad change. Pt has no signs of infection. Pt C/O nausea and Zofran given IVP. Pt able to reposition herself in bed. Pt free of injury. Call light within reach. Bed in low position with side rails up X 2.

## 2018-08-17 NOTE — LACTATION NOTE
Spoke with mother in LDR.  Family member at bedside as .  Mother states that she only wants to formula feed the baby at this time.  Instructed on the risks of formula feeding including:   Lacks the nutrients found in colostrums to help prevent infection, mature the gut, aid in digestion and resist allergies   Contains artificial additives and preservatives which increases incidence of contamination   Increase spitting up due to slower digestion   Increased cost and requires preparation, including bottle sanitation and formula refrigeration   Increased incidence of NEC for the  baby   Increased risk of diabetes with family history, SIDS and ear infections   Skipped feedings for the breastfeeding mother increases chance of engorgement, mastitis and plugged ducts   Decreases breastfeeding babys appetite resulting in poor feeding session, decreased breast stimulation and poor milk supply   Exposes the breastfeeding baby to the possibility of allergic reactions and colic  Discussed the benefits of breastmilk for the NICU baby.  Discussed the process of pumping to stimulate milk supply.  Continues to request formula.  NICU breastfeeding packet given to family member in english to translate to pt.  Instructed to call for any questions or concerns.  Pt states understanding and verbalized appropriate recall.

## 2018-08-17 NOTE — ANESTHESIA PREPROCEDURE EVALUATION
2018  Hayley Coleman is a 27 y.o., female   Pre-operative evaluation for Procedure(s) (LRB):   SECTION (N/A)    Hayley Coleman is a 27 y.o. female   OB History      Para Term  AB Living    2 1 1     1    SAB TAB Ectopic Multiple Live Births            1          Patient Active Problem List   Diagnosis    Abnormal maternal serum screening test    Complete placenta previa     Fibroid - multiple fibroids, largest posterior 3 cm    Pregnancy    Complete placenta previa with hemorrhage, third trimester       Review of patient's allergies indicates:  No Known Allergies    No current facility-administered medications on file prior to encounter.      Current Outpatient Medications on File Prior to Encounter   Medication Sig Dispense Refill    prenatal vit103-iron fum-folic () 27 mg iron- 1 mg Tab Take 1 tablet by mouth once daily. 30 tablet 11       History reviewed. No pertinent surgical history.    Social History     Socioeconomic History    Marital status:      Spouse name: Not on file    Number of children: Not on file    Years of education: Not on file    Highest education level: Not on file   Social Needs    Financial resource strain: Not on file    Food insecurity - worry: Not on file    Food insecurity - inability: Not on file    Transportation needs - medical: Not on file    Transportation needs - non-medical: Not on file   Occupational History    Not on file   Tobacco Use    Smoking status: Never Smoker    Smokeless tobacco: Never Used   Substance and Sexual Activity    Alcohol use: No    Drug use: No    Sexual activity: Yes     Partners: Male   Other Topics Concern    Not on file   Social History Narrative    Not on file         CBC:   Recent Labs      18   0520   WBC  11.03   RBC  4.41   HGB  12.2   HCT  34.7*   PLT  228   MCV  79*   MCH   27.7   [facility]  35.2     Anesthesia Evaluation    I have reviewed the Patient Summary Reports.     I have reviewed the Medications.     Review of Systems  Anesthesia Hx:  No problems with previous Anesthesia Denies Hx of Anesthetic complications  History of prior surgery of interest to airway management or planning: Denies Family Hx of Anesthesia complications.   Denies Personal Hx of Anesthesia complications.   Social:  No Alcohol Use, Non-Smoker    Hematology/Oncology:  Hematology Normal   Oncology Normal     EENT/Dental:EENT/Dental Normal   Cardiovascular:  Cardiovascular Normal Exercise tolerance: good     Pulmonary:  Pulmonary Normal    Renal/:  Renal/ Normal     Hepatic/GI:  Hepatic/GI Normal    OB/GYN/PEDS:   with complete placenta previa presenting with bleeding   Neurological:  Neurology Normal    Endocrine:  Endocrine Normal    Psych:  Psychiatric Normal           Physical Exam  General:  Well nourished    Airway/Jaw/Neck:  Airway Findings: Mouth Opening: Normal Tongue: Normal  General Airway Assessment: Adult, Average  Mallampati: II  TM Distance: Normal, at least 6 cm  Jaw/Neck Findings:  Neck ROM: Normal ROM      Dental:  Dental Findings: In tact   Chest/Lungs:  Chest/Lungs Findings: Normal Respiratory Rate, Clear to auscultation     Heart/Vascular:  Heart Findings: Rate: Normal  Rhythm: Regular Rhythm        Mental Status:  Mental Status Findings:  Alert and Oriented, Cooperative         Anesthesia Plan  Type of Anesthesia, risks & benefits discussed:  Anesthesia Type:  spinal  Patient's Preference:   Intra-op Monitoring Plan: standard ASA monitors  Intra-op Monitoring Plan Comments:   Post Op Pain Control Plan: PCA, multimodal analgesia and IV/PO Opioids PRN  Post Op Pain Control Plan Comments:   Induction:    Beta Blocker:  Patient is not currently on a Beta-Blocker (No further documentation required).       Informed Consent: Patient understands risks and agrees with Anesthesia plan.  Questions  answered. Anesthesia consent signed with patient.  ASA Score: 2     Day of Surgery Review of History & Physical:    H&P update referred to the surgeon.         Ready For Surgery From Anesthesia Perspective.

## 2018-08-17 NOTE — L&D DELIVERY NOTE
Delivery Note    Obstetrician:   [place]    Pre-Delivery Diagnosis: 33 weeks, previa with bleeding and ctx    Post-Delivery Diagnosis: same    Procedure: Primary  section    Infant Wt:  pending    Apgars: 1' 6  /  5' 8     Placenta and Cord:    Spontaneous and intact    Specimen: None    Estimated Blood Loss: 500           Complications:  None        Condition:  Mom and baby in good condition    Findings normal appearing gravid uterus with several fibroids, tubes, ovaries, vertex vigorous male infant    Patient taken to OR prepped and draped in usual sterile fashion.  Pfannenstiel incision made and taken down to fascia.  Fascia excised sharply and dissected away from rectus muscles.  Peritonuem identified and entered bluntly.  Bladder flap made and bladder blade placed.  Low transverse uterine incision made with scalpel and extended by pulling and infant delivered atraumatically and handed off to [facility].  Placenta delivered and uterus exteriorized and cleared of clots and debris.  Hysterotomy closed in running locking fashion with good hemostasis. Uterus returned to abdominal cavity and excess clots removed from abdomen.  Rectus muscles made hemostatic with cautery and re-approximated with chromic suture.  Fascia closed with PDS in running non locking fashion.  Subcutaenous tissue irrigated and made hemostatic with cautery.  Skin closed with absorbable staples.  Sterile pressure dressing applied.  Patient in stable condition, sponge lap and needle counts correct x 2.      Delivery Information for  Chuy Coleman    Birth information:  YOB: 2020   Time of birth: 9:52 AM   Sex: male   Head Delivery Date/Time:     Delivery type: , Low Transverse   Gestational Age: 33w4d    Delivery Providers    Delivering clinician:             Measurements    Weight:    Length:           Apgars    Living status:    Apgars:   1 min.:   5 min.:   10 min.:   15 min.:   20 min.:     Skin color:           Heart rate:          Reflex irritability:          Muscle tone:          Respiratory effort:          Total:                                 Interventions/Resuscitation         Cord    No data filed        Placenta    Placenta delivery date/time:    Placenta removal:             Labor Events:       labor:       Labor Onset Date/Time:         Dilation Complete Date/Time:         Start Pushing Date/Time:       Rupture Date/Time:              Rupture type:           Fluid Amount:        Fluid Color:        Fluid Odor:        Membrane Status (PeriCalm):        Rupture Date/Time (PeriCalm):        Fluid Amount (PeriCalm):        Fluid Color (PeriCalm):         steroids:       Antibiotics given for GBS:       Induction:       Indications for induction:        Augmentation:       Indications for augmentation:       Labor complications:       Additional complications:          Cervical ripening:                     Delivery:      Episiotomy:       Indication for Episiotomy:       Perineal Lacerations:   Repaired:      Periurethral Laceration:   Repaired:     Labial Laceration:   Repaired:     Sulcus Laceration:   Repaired:     Vaginal Laceration:   Repaired:     Cervical Laceration:   Repaired:     Repair suture:       Repair # of packets:       Vaginal delivery QBL (mL):        QBL (mL): 990     Combined Blood Loss (mL): 990     Vaginal Sweep Performed:       Surgicount Correct:         Other providers:            Details (if applicable):  Trial of Labor      Categorization:      Priority:     Indications for :     Incision Type:       Additional  information:  Forceps:    Vacuum:    Breech:    Observed anomalies    Other (Comments):

## 2018-08-18 LAB
BASOPHILS # BLD AUTO: 0.01 K/UL
BASOPHILS NFR BLD: 0.1 %
DIFFERENTIAL METHOD: ABNORMAL
EOSINOPHIL # BLD AUTO: 0 K/UL
EOSINOPHIL NFR BLD: 0.1 %
ERYTHROCYTE [DISTWIDTH] IN BLOOD BY AUTOMATED COUNT: 14 %
HCT VFR BLD AUTO: 29.6 %
HGB BLD-MCNC: 10.1 G/DL
LYMPHOCYTES # BLD AUTO: 1.7 K/UL
LYMPHOCYTES NFR BLD: 9.3 %
MCH RBC QN AUTO: 27.9 PG
MCHC RBC AUTO-ENTMCNC: 34.1 G/DL
MCV RBC AUTO: 82 FL
MONOCYTES # BLD AUTO: 1.4 K/UL
MONOCYTES NFR BLD: 7.7 %
NEUTROPHILS # BLD AUTO: 15.2 K/UL
NEUTROPHILS NFR BLD: 82.2 %
PLATELET # BLD AUTO: 226 K/UL
PMV BLD AUTO: 10.9 FL
RBC # BLD AUTO: 3.62 M/UL
RPR SER QL: NORMAL
WBC # BLD AUTO: 18.5 K/UL

## 2018-08-18 PROCEDURE — 63600175 PHARM REV CODE 636 W HCPCS: Performed by: OBSTETRICS & GYNECOLOGY

## 2018-08-18 PROCEDURE — 25000003 PHARM REV CODE 250: Performed by: OBSTETRICS & GYNECOLOGY

## 2018-08-18 PROCEDURE — 11000001 HC ACUTE MED/SURG PRIVATE ROOM

## 2018-08-18 PROCEDURE — 85025 COMPLETE CBC W/AUTO DIFF WBC: CPT

## 2018-08-18 PROCEDURE — 36415 COLL VENOUS BLD VENIPUNCTURE: CPT

## 2018-08-18 RX ORDER — DOCUSATE SODIUM 100 MG/1
200 CAPSULE, LIQUID FILLED ORAL 2 TIMES DAILY PRN
Status: DISCONTINUED | OUTPATIENT
Start: 2018-08-18 | End: 2018-08-21 | Stop reason: HOSPADM

## 2018-08-18 RX ORDER — ADHESIVE BANDAGE
30 BANDAGE TOPICAL DAILY PRN
Status: DISCONTINUED | OUTPATIENT
Start: 2018-08-18 | End: 2018-08-21 | Stop reason: HOSPADM

## 2018-08-18 RX ADMIN — DOCUSATE SODIUM 200 MG: 100 CAPSULE, LIQUID FILLED ORAL at 11:08

## 2018-08-18 RX ADMIN — OXYCODONE HYDROCHLORIDE AND ACETAMINOPHEN 1 TABLET: 5; 325 TABLET ORAL at 05:08

## 2018-08-18 RX ADMIN — KETOROLAC TROMETHAMINE 30 MG: 30 INJECTION, SOLUTION INTRAMUSCULAR at 05:08

## 2018-08-18 RX ADMIN — OXYCODONE HYDROCHLORIDE AND ACETAMINOPHEN 1 TABLET: 5; 325 TABLET ORAL at 11:08

## 2018-08-18 RX ADMIN — MAGNESIUM HYDROXIDE 2400 MG: 400 SUSPENSION ORAL at 04:08

## 2018-08-18 RX ADMIN — SIMETHICONE CHEW TAB 80 MG 80 MG: 80 TABLET ORAL at 10:08

## 2018-08-18 RX ADMIN — SIMETHICONE CHEW TAB 80 MG 80 MG: 80 TABLET ORAL at 04:08

## 2018-08-18 RX ADMIN — OXYCODONE HYDROCHLORIDE AND ACETAMINOPHEN 1 TABLET: 10; 325 TABLET ORAL at 05:08

## 2018-08-18 RX ADMIN — OXYCODONE HYDROCHLORIDE AND ACETAMINOPHEN 1 TABLET: 10; 325 TABLET ORAL at 09:08

## 2018-08-18 RX ADMIN — IBUPROFEN 600 MG: 600 TABLET ORAL at 11:08

## 2018-08-18 RX ADMIN — KETOROLAC TROMETHAMINE 30 MG: 30 INJECTION, SOLUTION INTRAMUSCULAR at 12:08

## 2018-08-18 RX ADMIN — OXYCODONE HYDROCHLORIDE AND ACETAMINOPHEN 1 TABLET: 10; 325 TABLET ORAL at 12:08

## 2018-08-18 RX ADMIN — IBUPROFEN 600 MG: 600 TABLET ORAL at 05:08

## 2018-08-18 NOTE — PLAN OF CARE
Problem: Patient Care Overview  Goal: Individualization & Mutuality  Outcome: Ongoing (interventions implemented as appropriate)  VSS, iv fluids discontinued, PCA discontinued, patient given Percocet 10 mg for incisional pain, fernández catheter discontinued, encouraged ambulation and fluids, verbalized understanding.

## 2018-08-18 NOTE — PROGRESS NOTES
Patient was encouraged to walk as much as possible today. Dr. Nuñez also encouraged the patient to walk to help with flatus. I confirmed with the patient via [employer]  Hector # [id number] that the patient wanted to use the wheelchair as support to walk to the unit during her visits to the NICU. Explained to the patient that she could keep the wheelchair in her room provided it is not needed to transport a patient.

## 2018-08-18 NOTE — PROGRESS NOTES
Postop day 1  Patient doing well, no complaints.  Tolerating diet    Temp:  [96.6 °F (35.9 °C)-98 °F (36.7 °C)]   Pulse:  []   Resp:  [14-20]   BP: (110-132)/(60-79)   SpO2:  [89 %-100 %]   Abd soft nondistended fundus firm and nontender  Incision clean, dry, intact    Recent Labs   Lab  08/18/18   0624   WBC  18.50*   HGB  10.1*   HCT  29.6*   PLT  226       A&P  Post op doing well.  Routine post op care.

## 2018-08-18 NOTE — PROGRESS NOTES
Patient was observed to be distended on assessment and complained of pain. Dr. Nuñez notified and TORB for docusate sodium 200 mg PO BID and Milk of magnesia Daily PRN.

## 2018-08-18 NOTE — PLAN OF CARE
Problem: Postpartum ( Delivery) (Adult,Obstetrics,Pediatric)  Intervention: Monitor/Manage Pain  Patient complains of incisional pain, PCA in use until midnight, then discontinued, patient give Percocet 10 mg PO and is well controlled.

## 2018-08-18 NOTE — PLAN OF CARE
Problem: Patient Care Overview  Goal: Plan of Care Review  Outcome: Ongoing (interventions implemented as appropriate)  Patient alert and oriented with even & unlabored respirations. Pain managed with scheduled and/or PRN pain medicine. POC discussed with the patient & her spouse, and both patient and spouse verbalized understanding via [employer] language line Hector # [id number]..

## 2018-08-18 NOTE — PROGRESS NOTES
Pt continues to complain of pain. Milk of magnesia, motrin, and percocet administer to the patient to address pain. Patient given a full cup of warmed prune juice and was observed drinking it all. Pillow placed at bedside for splinting and explained. Abdominal binder applied to the patient.

## 2018-08-19 PROCEDURE — 25000003 PHARM REV CODE 250: Performed by: OBSTETRICS & GYNECOLOGY

## 2018-08-19 PROCEDURE — 11000001 HC ACUTE MED/SURG PRIVATE ROOM

## 2018-08-19 PROCEDURE — 94761 N-INVAS EAR/PLS OXIMETRY MLT: CPT

## 2018-08-19 PROCEDURE — 90715 TDAP VACCINE 7 YRS/> IM: CPT | Performed by: OBSTETRICS & GYNECOLOGY

## 2018-08-19 PROCEDURE — 3E0234Z INTRODUCTION OF SERUM, TOXOID AND VACCINE INTO MUSCLE, PERCUTANEOUS APPROACH: ICD-10-PCS | Performed by: OBSTETRICS & GYNECOLOGY

## 2018-08-19 PROCEDURE — 90471 IMMUNIZATION ADMIN: CPT | Performed by: OBSTETRICS & GYNECOLOGY

## 2018-08-19 PROCEDURE — 63600175 PHARM REV CODE 636 W HCPCS: Performed by: OBSTETRICS & GYNECOLOGY

## 2018-08-19 RX ADMIN — OXYCODONE HYDROCHLORIDE AND ACETAMINOPHEN 1 TABLET: 10; 325 TABLET ORAL at 09:08

## 2018-08-19 RX ADMIN — CLOSTRIDIUM TETANI TOXOID ANTIGEN (FORMALDEHYDE INACTIVATED), CORYNEBACTERIUM DIPHTHERIAE TOXOID ANTIGEN (FORMALDEHYDE INACTIVATED), BORDETELLA PERTUSSIS TOXOID ANTIGEN (GLUTARALDEHYDE INACTIVATED), BORDETELLA PERTUSSIS FILAMENTOUS HEMAGGLUTININ ANTIGEN (FORMALDEHYDE INACTIVATED), BORDETELLA PERTUSSIS PERTACTIN ANTIGEN, AND BORDETELLA PERTUSSIS FIMBRIAE 2/3 ANTIGEN 0.5 ML: 5; 2; 2.5; 5; 3; 5 INJECTION, SUSPENSION INTRAMUSCULAR at 12:08

## 2018-08-19 RX ADMIN — MAGNESIUM HYDROXIDE 2400 MG: 400 SUSPENSION ORAL at 12:08

## 2018-08-19 RX ADMIN — IBUPROFEN 600 MG: 600 TABLET ORAL at 12:08

## 2018-08-19 RX ADMIN — IBUPROFEN 600 MG: 600 TABLET ORAL at 06:08

## 2018-08-19 RX ADMIN — OXYCODONE HYDROCHLORIDE AND ACETAMINOPHEN 1 TABLET: 5; 325 TABLET ORAL at 06:08

## 2018-08-19 RX ADMIN — DOCUSATE SODIUM 200 MG: 100 CAPSULE, LIQUID FILLED ORAL at 09:08

## 2018-08-19 RX ADMIN — IBUPROFEN 600 MG: 600 TABLET ORAL at 05:08

## 2018-08-19 RX ADMIN — SIMETHICONE CHEW TAB 80 MG 80 MG: 80 TABLET ORAL at 09:08

## 2018-08-19 NOTE — PLAN OF CARE
Problem: Patient Care Overview  Goal: Plan of Care Review  Outcome: Ongoing (interventions implemented as appropriate)  Pump minimum of 8 times in 24 hours.

## 2018-08-19 NOTE — LACTATION NOTE
08/19/18 1000   Maternal Infant Assessment   Breast Density soft   Areola elastic   Nipple(s) everted   Breasts WDL   Breasts WDL WDL   Maternal Infant Feeding   Time Spent (min) 0-15 min   Engorgement Measures supportive bra encouraged   Breastfeeding Education importance of skin-to-skin contact;increasing milk supply;label/storage of breast milk;milk expression, electric pump   Equipment Type/Education   Pump Type Symphony   Breast Pump Flange Size 27 mm   Breast Pumping (encouraged pumping minimum 8 times in 24 hours)   Pumping Frequency (times) (Has not pumped since last evening.)   Lactation Interventions   Attachment Promotion counseling provided;skin-to-skin contact encouraged   Breastfeeding Assistance support offered   Maternal Breastfeeding Support encouragement offered;lactation counseling provided;maternal hydration promoted;maternal nutrition promoted;maternal rest encouraged   Patient states that her plan is to pump only while baby is in the hospital. Does not plan to breastfeed or pump after baby discharged. NICU pumping reviewed. Father of baby interpreted.

## 2018-08-19 NOTE — PLAN OF CARE
Problem: Patient Care Overview  Goal: Plan of Care Review  PT progressing well. NAD noted. VSS. Pt tolerating po pain medication. Ambulating and voiding. Pumping 8 or more times in 24 hours and bringing breast milk to NICU. POC discussed with pt. Understanding verbalized.

## 2018-08-19 NOTE — PROGRESS NOTES
Delivery Progress Note  Obstetrics        SUBJECTIVE:     Postpartum Day 2:  Delivery    Ms. Coleman states she feels well. She denies emotional concerns. Her pain is moderately controlled with current medications. The baby is in the NICU. The patient is pumping her breasts. The patient is ambulating well. Ms. Coleman is tolerating a normal diet. Flatus has been passed. Urinary output is adequate.    OBJECTIVE:     Vital Signs (Most Recent):  Temp: 98.3 °F (36.8 °C) (18)  Pulse: 84 (18)  Resp: 18 (18)  BP: 125/85 (18)  SpO2: 99 % (18)    Vital Signs Range (Last 24H):  Temp:  [97.1 °F (36.2 °C)-99.2 °F (37.3 °C)]   Pulse:  [77-90]   Resp:  [18-20]   BP: (111-125)/(58-85)   SpO2:  [99 %]     I & O (Last 24H):    Intake/Output Summary (Last 24 hours) at 2018 0831  Last data filed at 2018 2300  Gross per 24 hour   Intake 1720 ml   Output --   Net 1720 ml     Physical Exam:  General:    no distress   Lungs:  clear to auscultation bilaterally   Heart:  regular rate and rhythm, S1, S2 normal, no murmur, click, rub or gallop   Breasts:  no discharge, erythema, or tenderness   Abdomen:  soft, non-tender; bowel sounds normal   Fundus:  firm   Incision:  healing well   Lochia:   moderate rubra   DVT Evaluation:  No evidence of DVT on either side seen on physical exam.  Negative Viet's sign bilaterally.     Hemoglobin/Hematocrit  Recent Labs   Lab  18   0624   HGB  10.1*   HCT  29.6*     ABO/Rh  Lab Results   Component Value Date    GROUPTRH O POS 2018     Rubella  No results for input(s): [id number] in the last 168 hours.    ASSESSMENT/PLAN:     Status post  section. Doing well postoperatively.     Continue current care.

## 2018-08-20 PROCEDURE — 25000003 PHARM REV CODE 250: Performed by: OBSTETRICS & GYNECOLOGY

## 2018-08-20 PROCEDURE — 11000001 HC ACUTE MED/SURG PRIVATE ROOM

## 2018-08-20 RX ADMIN — IBUPROFEN 600 MG: 600 TABLET ORAL at 07:08

## 2018-08-20 RX ADMIN — OXYCODONE HYDROCHLORIDE AND ACETAMINOPHEN 1 TABLET: 10; 325 TABLET ORAL at 12:08

## 2018-08-20 RX ADMIN — IBUPROFEN 600 MG: 600 TABLET ORAL at 12:08

## 2018-08-20 RX ADMIN — OXYCODONE HYDROCHLORIDE AND ACETAMINOPHEN 1 TABLET: 10; 325 TABLET ORAL at 07:08

## 2018-08-20 RX ADMIN — IBUPROFEN 600 MG: 600 TABLET ORAL at 06:08

## 2018-08-20 NOTE — LACTATION NOTE
"   08/20/18 1050   Maternal Infant Assessment   Breast Density Bilateral:;full   Areola Bilateral:;elastic   Nipple(s) Bilateral:;everted   Breasts WDL   Breasts WDL WDL       Number Scale   Presence of Pain complains of pain/discomfort   Location - Side Bilateral   Location breast   Pain Rating: Activity 3   Factors that Relieve Pain other (see comments)  (breastpumping)   Maternal Infant Feeding   Maternal Emotional State relaxed;assist needed   Time Spent (min) 0-15 min   Equipment Type/Education   Pump Type Symphony   Breast Pump Type double electric, hospital grade   Breast Pump Flange Type hard   Breast Pump Flange Size 27 mm   Lactation Referrals   Lactation Consult Follow up;Pump teaching;Knowledge deficit   Lactation Interventions   Breastfeeding Assistance electric breast pump used   Maternal Breastfeeding Support encouragement offered;lactation counseling provided   FOB at bedside as .  Breast bilaterally hard with pain 3/10.  Engorgement precautions given and instructed to pump a minimum of 8 x in 24 hours.  Encouraged to call for assist prn.  States "understand" and verbalized appropriate recall.  "

## 2018-08-20 NOTE — PLAN OF CARE
Problem: Patient Care Overview  Goal: Individualization & Mutuality  Outcome: Ongoing (interventions implemented as appropriate)  VSS, ambulates to the NICU with her  to see her infant, voiding without difficulty, complains of incisional pain and abdominal cramping, incision is well approximated with steri-strips intact, reviewed care of incision, fundus is firm with a light amount of lochia rubra,  is at her bedside and assists in her care.

## 2018-08-20 NOTE — PLAN OF CARE
Problem: Patient Care Overview  Goal: Plan of Care Review  Outcome: Ongoing (interventions implemented as appropriate)  Plan of care reviewed with patient and her , all questions answered.

## 2018-08-20 NOTE — PLAN OF CARE
Problem: Breastfeeding (Adult,Obstetrics,Pediatric)  Intervention: Promote Positive Maternal Experience  Patient is using a double electric breast pump at her bedside to express some milk for her NICU infant, encouraged hydration, reviewed use of pump, frequency of pumping, and EBM storage.  Verbalized understanding.

## 2018-08-20 NOTE — PROGRESS NOTES
No complaints.  Pain is controlled.  No n/v.     Vital Signs (Most Recent):  Temp: 97.6 °F (36.4 °C) (08/20/18 0750)  Pulse: 95 (08/20/18 0750)  Resp: 20 (08/20/18 0750)  BP: 118/80 (08/20/18 0750)  SpO2: 99 % (08/19/18 0809)    Vital Signs Range (Last 24H):  Temp:  [97.6 °F (36.4 °C)-100 °F (37.8 °C)]   Pulse:  [81-95]   Resp:  [18-20]   BP: (108-124)/(60-80)     Gen - NAD  Abd - soft, appropriately tender, non-distended  Incision - c/d/i      Recent Labs   Lab  08/18/18   0624   WBC  18.50*   HGB  10.1*   HCT  29.6*   PLT  226       POD # 3  Routine care/ wants to stay till tomorrow since baby is in icu

## 2018-08-21 VITALS
BODY MASS INDEX: 33.46 KG/M2 | TEMPERATURE: 98 F | SYSTOLIC BLOOD PRESSURE: 114 MMHG | HEIGHT: 64 IN | RESPIRATION RATE: 20 BRPM | HEART RATE: 80 BPM | OXYGEN SATURATION: 99 % | DIASTOLIC BLOOD PRESSURE: 73 MMHG | WEIGHT: 196 LBS

## 2018-08-21 PROCEDURE — 25000003 PHARM REV CODE 250: Performed by: OBSTETRICS & GYNECOLOGY

## 2018-08-21 RX ORDER — IBUPROFEN 600 MG/1
600 TABLET ORAL EVERY 6 HOURS
Qty: 60 TABLET | Refills: 1 | Status: SHIPPED | OUTPATIENT
Start: 2018-08-21

## 2018-08-21 RX ADMIN — IBUPROFEN 600 MG: 600 TABLET ORAL at 12:08

## 2018-08-21 RX ADMIN — IBUPROFEN 600 MG: 600 TABLET ORAL at 06:08

## 2018-08-21 RX ADMIN — IBUPROFEN 600 MG: 600 TABLET ORAL at 11:08

## 2018-08-21 RX ADMIN — OXYCODONE HYDROCHLORIDE AND ACETAMINOPHEN 1 TABLET: 5; 325 TABLET ORAL at 11:08

## 2018-08-21 NOTE — PROGRESS NOTES
"COPIED FROM BABY CHART  NICU/MB/LD DISCHARGE ASSESSMENT     NAME: Stas Coleman  DX: Prematurity [P07.30]  Birth Hospital: [facility]     Birth Wt: 2.33 kg (5 lb 2.2 oz)  Birth Ln: 18.31"  EGA: 33w 4d     DEMOGRAPHICS     Mother: Hayley Coleman  Address: [address] [place] [id number]  Phone: [phone] (home)   Employer:     Father: Charan Coleman  Address: same  Phone cell [phone]  Employer:  Signed Birth Certificate:      Support Persons: father's sister and mother's cousins  Siblings: 6 yr old     CLINICAL     Pediatrician: Dr Shahid  Pharmacy: [facility], [address]     SW met with pt's mother and father, introduced herself to complete NICU assessment. Pt's mother was easily engaged. MAYKEL explained her role in , provided contact information. Uses interpretor line: [employer] #[phone] on speaker phone. Dad speaks English as well     DIscharge planning assessment completed. Pt will be residing with parents, sibling and mom's brother in law and his wife at current address. Pt's mother has basic essential needs such as crib and carseat. SW inquired about feedings. Mom voiced that she will be breast feeding pt. Mom is not linked to WIC. SW informed mom of the importance of using a hospital grade pump and obtaining one from WIC. Will provide mom letter and written information regarding WIC so that she can obtain appointment prior to infant discharge. Mom has transportation to and from the hospital to bring breast milk and for when Pt is discharged home.      Mom verified Pt's insurance. MAYKEL informed Mom of having pt added to insurance when she returns home.      Mom has no concerns or questions at this time. SW will continue to follow Pt while in the NICU      "

## 2018-08-21 NOTE — PROGRESS NOTES
Pt and  verbalized understanding of all discharge instructions including follow up appointment and medications.   translated all discharge instructions to patient.  No complaints.  No signs of distress.

## 2018-08-21 NOTE — PROGRESS NOTES
Postop day 4  Patient doing well, no complaints. Ambulating. Lochia minimal,  denies n/v, denies h/a, vision changes, upper abd pain, chest pain, sob. Pain is controlled. Tolerating diet, PASSING flatus    Patient Active Problem List   Diagnosis    Abnormal maternal serum screening test    Complete placenta previa     Fibroid - multiple fibroids, largest posterior 3 cm    Pregnancy    Complete placenta previa with hemorrhage, third trimester       Temp:  [97.3 °F (36.3 °C)-99.9 °F (37.7 °C)] 98 °F (36.7 °C)  Pulse:  [] 82  Resp:  [20] 20  BP: (117-134)/(72-81) 124/81  Alert, orientated  Regular rate  Normal resp effort  Abd soft nondistended fundus firm and appropriately tender  Incision clean, dry, intact; No erythema  Ext: no significant edema; nontender calves    Recent Labs   Lab  18   0821   18   0624   WBC   --    < >  18.50*   HGB   --    < >  10.1*   HCT   --    < >  29.6*   PLT   --    < >  226   GROUPTRH  O POS   --    --     < > = values in this interval not displayed.       A&P  27 y.o.  s/p c/d  Post op doing well.  Routine post op care.  Infant in nicu

## 2018-08-21 NOTE — DISCHARGE SUMMARY
27 y.o.   OB History      Para Term  AB Living    2 2 1 1   2    SAB TAB Ectopic Multiple Live Births          0 2        admitted for Delivery. See procedure note. Postpartum course was unremarkable.     Admit date 2018  4:06 AM  D/c date 2018    Disposition: Home  Activity 6 weeks pelvic rest  Diet: normal  Discharge followup 1 week, if . Follow up in 6 weeks if vaginal delivery  Meds listed separately

## 2018-08-21 NOTE — LACTATION NOTE
08/21/18 1150   Maternal Infant Assessment   Breast Density Bilateral:;full   Areola Bilateral:;elastic   Nipple(s) Bilateral:;everted   Breasts WDL   Breasts WDL WDL   Left Breast Symptoms full   Right Breast Symptoms full   Pain Reassessment   Pain Rating Prior to Med Admin 5   Maternal Infant Feeding   Maternal Emotional State independent;relaxed   Presence of Pain no   Time Spent (min) 15-30 min   Engorgement Measures complete emptying encouraged;supportive bra encouraged   Breastfeeding Education increasing milk supply;label/storage of breast milk;milk expression, electric pump;prenatal vitamins continued   Breastfeeding History   Currently Breastfeeding no   Equipment Type/Education   Pump Type Symphony   Breast Pump Type double electric, hospital grade   Breast Pump Flange Type hard   Breast Pump Flange Size 27 mm   Breast Pumping Bilateral Breasts:;pumped until emptied;milk labeled/stored   Lactation Referrals   Lactation Consult Follow up;Knowledge deficit;Pump teaching   Lactation Interventions   Attachment Promotion counseling provided;privacy provided;role responsibility promoted   Breastfeeding Assistance electric breast pump used;support offered;milk expression/pumping   Maternal Breastfeeding Support encouragement offered;lactation counseling provided;maternal hydration promoted;maternal nutrition promoted;maternal rest encouraged   mother pumping for infant in NICU -states breasts full but comfortable today -reinforced frequent pumping at least 8 times in 24 hours and labeling and storage of milk -given transport bag and JADEN pump for use at home until able to get WIC pump -reinforced cleaning and sterilization of collection kit -states understanding of all information and all questions answered -FOB interpreting in [place] at bedside for mother and demonstrates understanding of information

## 2018-08-21 NOTE — DISCHARGE INSTRUCTIONS
After a Cesearean Birth    General Discharge Instructions  · May follow a regular diet, unless otherwise discussed with physician.    · Take showers, not baths unless otherwise discussed with physician.    · Activity as tolerated.    · No lifting or heavy exercise for 6 weeks, no driving for 2 weeks, no sexual   intercourse, douching or tampons for 6 weeks    · May return to work/school as discussed with physician    · Discuss birth control with physician    · Take medications as directed    · Breast care support bra worn at all times    · Lactation consultant referral number ( [phone] or [phone])    Call Your Healthcare Provider Right Away If You Have:  · A temperature of 100.4°F or higher.  · If your blood pressure is over 155/105.  · You have difficulty catching your breath or trouble breathing.  · Heavy vaginal bleeding, clots, or vaginal discharge with foul odor. (heavier than menses)  · Persistent nausea or vomiting.  · You gain more than 3 pounds in 3 days.  · Severe headaches not relieved by Tylenol (acetaminophen) or Motrin (ibuprofen)  · Blurry or double vision, see spots or flashing lights.  · Dizziness or fainting.  · New onset swelling or worsening of existing swelling.  · Burning or pain when you urinate.  · No bowel movement for 5 days.  · Redness, warmth, swelling, or pain in the lower leg.  · Redness, discharge, or pain worse than you had in the hospital.  · Burning, pain, red streaks, or lumpy areas in your breasts.  · Cracks, blisters, or blood on your nipples.  · Feelings of extreme sadness or anxiety, or a feeling that you dont want to be with your baby.  · If you have any new or unusual symptoms or have questions or concerns    Some of these symptoms can occur up to 4 to 6 weeks after delivery. This can be a sign of pre-eclampsia, which is a serious disease that can cause stroke, seizures, organ damage, or death. Do not wait to call your doctor or seek medical  attention.          Incision Care  · If you have an Aquacel dressing, then it stays in place for 1 week. It is waterproof and will be removed at your post-op visit. If it comes off before then, call your physician and keep the incision clean with warm soapy water and pat dry.  · Watch your incision for signs of infection, such as increasing redness or drainage, or a foul smelling odor.  · For ease of movement, hold a pillow against the incision when you get up from a lying or sitting position, and when you laugh or cough.  · Avoid heavy lifting--nothing heavier than your baby until your doctor instructs you otherwise.     Follow-Up  Schedule a  follow-up exam with your healthcare provider for about 1 week after delivery. During this exam, your uterus and vaginal area will be checked. Contact your healthcare provider if you think you or your baby are having any problems.        Pumping for the Baby in NICU    Preparation and Hygiene:  Shower daily.  Wear a clean bra each day and wash daily in warm soapy water.  1. Change wet or moist breast pads frequently.  Moist pads can promote growth of germs.  2. Actively wash your hands, paying close attention to the area around and under your fingernails, thoroughly with soap and water for 15 seconds before pumping or handling your milk.  Re-wash your hands if you touch anything (scratching your nose, answering the phone, etc) while pumping or handling your milk.   3. Before pumping your breasts, assemble the pump collection kit and have ready the sterile container and labels.  Place these items on a clean surface next to the breast pump.  4. Each time after you have finished pumping, take apart all of the parts of the breast pump collection kit and place them in a separate cleaning container (do not place them in the sink).  Be sure to remove the yellow valve from the breast shield and separate the white membrane from the yellow valve.  Rinse all of these  parts with cool water.  Then use a new sponge and/or bottle brush and dishwashing detergent to clean the parts.  Rinse off the soapy water with cool water and air dry on a clean towel covered with a clean cloth.  All parts may also be washed after each use in the top rack of a .  5. Once each day, sanitize all of the parts of the breast pump collection kit.  This can be done by boiling the kit parts for 10 minutes or by using a Quick Clean Micro-Steam Bag made by [employer]  6. If condensation appears in the tubing, continue to run the pump with the tubing attached for 1-2 minutes or until the tubing is dry.   7. Notify your babys nurse or doctor if you become ill or need to take any medication, even over-the-counter medicines.  Collection and Storage of Expressed Breast milk:       1. Pump your breasts at least 8-10 times every 24 hours.  Double pump (both breasts at the same time) for at least 15-20 minutes using the most suction that is comfortable.    2. Write the date and time of pumping and the name of any medications you are taking on the babys pre-printed hospital identification label.   3. Place your babys pre-printed hospital identification label on each container of breast milk.  Additional pre-printed labels can be obtained from your babys nurse.  If your expressed breast milk is not correctly labeled, the nurse cannot feed the milk to the baby.       4.    Do not touch the inside of the storage containers or lids.  5.        Pour the amount of expressed milk needed for 1 of your babys feedings into each storage container.  Use a new container(s) for each pumping.  Additional storage containers can be obtained from your babys nurse.  6. Tightly screw the lid onto the container and place immediately into the refrigerator for daily transportation to the hospital.   Do not freeze your milk unless asked to do so by your babys nurse.  However, if you are not able to visit your baby each  day, place the expressed breast milk in the freezer.  7.     Expressed breast milk should be refrigerated or frozen within 4 hours of pumping.  8.         Do not store expressed breast milk on the door of your refrigerator or freezer where the temperature is warmer.   Transportation of Expressed Breast milk:  1. Refrigerated breast milk or frozen milk should be packed tightly together in a cooler with frozen, gel-packs to keep the milk frozen.  DO NOT USE ICE CUBES (WET ICE) TO TRANSPORT FROZEN MILK.   A clean towel can be used to fill any extra space between containers of frozen milk.  2.    Bring your expressed milk from home each time you visit the baby.      Lactation Services - [phone]

## 2018-08-21 NOTE — PLAN OF CARE
Problem: Patient Care Overview  Goal: Plan of Care Review  Pt voiding urine and passing flatus. Vaginal bleed scant. Fundus firm. Incision intact. Breasts engorged. Encouraged to pump every 2-3 hours. Producing milk. EBM provided to NICU. Pt states relief noted post pumping. Motrin provided with percocet. Discussed heat, warm shower, and massage prior to emptying. Ambulating to see infant in NICU. Low grade temp noted.

## 2018-08-31 ENCOUNTER — TELEPHONE (OUTPATIENT)
Dept: OBSTETRICS AND GYNECOLOGY | Facility: HOSPITAL | Age: 28
End: 2018-08-31

## 2018-08-31 NOTE — TELEPHONE ENCOUNTER
"Lactation Telephone Follow up:  ATT  # [id number].  Spoke with pt.  Pt hung up phone after introduction and first question regarding breastfeeding.   returned call.  Reports breastfeeding q 3 hours for 10 - 25 min and also pumps and gives 2 oz EBM.  Reports a wet and yellow stool with each feeding.  Reports ped check scheduled for Set 7; instructed to keep as scheduled.  Denies any other c/o or concerns.  States "understand".  "

## 2019-04-01 PROBLEM — O28.0 ABNORMAL MATERNAL SERUM SCREENING TEST: Status: RESOLVED | Noted: 2018-04-25 | Resolved: 2019-04-01

## 2020-05-27 ENCOUNTER — LAB VISIT (OUTPATIENT)
Dept: PRIMARY CARE CLINIC | Facility: CLINIC | Age: 30
End: 2020-05-27
Payer: MEDICAID

## 2020-05-27 DIAGNOSIS — R05.9 COUGH: Primary | ICD-10-CM

## 2020-05-27 PROCEDURE — U0003 INFECTIOUS AGENT DETECTION BY NUCLEIC ACID (DNA OR RNA); SEVERE ACUTE RESPIRATORY SYNDROME CORONAVIRUS 2 (SARS-COV-2) (CORONAVIRUS DISEASE [COVID-19]), AMPLIFIED PROBE TECHNIQUE, MAKING USE OF HIGH THROUGHPUT TECHNOLOGIES AS DESCRIBED BY CMS-2020-01-R: HCPCS

## 2020-05-28 LAB — SARS-COV-2 RNA RESP QL NAA+PROBE: NOT DETECTED

## (undated) DEVICE — STRIP STERI 1/8 X 3

## (undated) DEVICE — PAD ABD 8X10 STERILE